# Patient Record
Sex: FEMALE | Race: WHITE | NOT HISPANIC OR LATINO | Employment: OTHER | URBAN - METROPOLITAN AREA
[De-identification: names, ages, dates, MRNs, and addresses within clinical notes are randomized per-mention and may not be internally consistent; named-entity substitution may affect disease eponyms.]

---

## 2017-01-01 DIAGNOSIS — E05.90 HYPERTHYROIDISM: ICD-10-CM

## 2017-01-03 RX ORDER — PROPYLTHIOURACIL 50 MG/1
TABLET ORAL
Qty: 40 TABLET | Refills: 0 | Status: SHIPPED | OUTPATIENT
Start: 2017-01-03 | End: 2017-05-10

## 2017-01-12 ENCOUNTER — TELEPHONE (OUTPATIENT)
Dept: ENDOCRINOLOGY | Age: 77
End: 2017-01-12

## 2017-01-12 DIAGNOSIS — E05.90 HYPERTHYROIDISM: ICD-10-CM

## 2017-01-12 RX ORDER — PROPYLTHIOURACIL 50 MG/1
50 TABLET ORAL 2 TIMES DAILY
Qty: 180 TABLET | Refills: 0 | Status: SHIPPED | OUTPATIENT
Start: 2017-01-12 | End: 2017-04-15 | Stop reason: SDUPTHER

## 2017-01-12 NOTE — TELEPHONE ENCOUNTER
----- Message from Jolie Raza sent at 1/12/2017 10:39 AM EST -----  Contact: Patient  Patient is calling to get a refill of  propylthiouracil (PTU) 50 MG tablet, one tablet 2 times per day, 90 day supply  Send to  [phone] (Phone)  [phone] (Fax)      SCRIPT SENT

## 2017-01-16 DIAGNOSIS — E05.90 HYPERTHYROIDISM: ICD-10-CM

## 2017-01-16 RX ORDER — PROPYLTHIOURACIL 50 MG/1
TABLET ORAL
Qty: 40 TABLET | Refills: 0 | OUTPATIENT
Start: 2017-01-16

## 2017-04-15 DIAGNOSIS — E05.90 HYPERTHYROIDISM: ICD-10-CM

## 2017-04-17 RX ORDER — PROPYLTHIOURACIL 50 MG/1
TABLET ORAL
Qty: 180 TABLET | Refills: 0 | Status: SHIPPED | OUTPATIENT
Start: 2017-04-17 | End: 2017-05-10

## 2017-04-27 ENCOUNTER — LAB (OUTPATIENT)
Dept: ENDOCRINOLOGY | Age: 77
End: 2017-04-27

## 2017-04-27 DIAGNOSIS — E04.2 MULTINODULAR GOITER: ICD-10-CM

## 2017-04-27 DIAGNOSIS — E05.90 HYPERTHYROIDISM: ICD-10-CM

## 2017-04-27 DIAGNOSIS — E05.90 HYPERTHYROIDISM: Primary | ICD-10-CM

## 2017-04-27 LAB
ALBUMIN SERPL-MCNC: 4 G/DL (ref 3.5–5.2)
ALBUMIN/GLOB SERPL: 1.4 G/DL
ALP SERPL-CCNC: 72 U/L (ref 39–117)
ALT SERPL-CCNC: 15 U/L (ref 1–33)
AST SERPL-CCNC: 23 U/L (ref 1–32)
BASOPHILS # BLD AUTO: 0.04 10*3/MM3 (ref 0–0.2)
BASOPHILS NFR BLD AUTO: 0.6 % (ref 0–1.5)
BILIRUB SERPL-MCNC: 0.4 MG/DL (ref 0.1–1.2)
BUN SERPL-MCNC: 21 MG/DL (ref 8–23)
BUN/CREAT SERPL: 28.4 (ref 7–25)
CALCIUM SERPL-MCNC: 9.8 MG/DL (ref 8.6–10.5)
CHLORIDE SERPL-SCNC: 105 MMOL/L (ref 98–107)
CO2 SERPL-SCNC: 26.6 MMOL/L (ref 22–29)
CREAT SERPL-MCNC: 0.74 MG/DL (ref 0.57–1)
EOSINOPHIL # BLD AUTO: 0.13 10*3/MM3 (ref 0–0.7)
EOSINOPHIL NFR BLD AUTO: 1.8 % (ref 0.3–6.2)
ERYTHROCYTE [DISTWIDTH] IN BLOOD BY AUTOMATED COUNT: 13.9 % (ref 11.7–13)
GLOBULIN SER CALC-MCNC: 2.9 GM/DL
GLUCOSE SERPL-MCNC: 91 MG/DL (ref 65–99)
HCT VFR BLD AUTO: 43.2 % (ref 35.6–45.5)
HGB BLD-MCNC: 13.7 G/DL (ref 11.9–15.5)
IMM GRANULOCYTES # BLD: 0 10*3/MM3 (ref 0–0.03)
IMM GRANULOCYTES NFR BLD: 0 % (ref 0–0.5)
LYMPHOCYTES # BLD AUTO: 2.96 10*3/MM3 (ref 0.9–4.8)
LYMPHOCYTES NFR BLD AUTO: 40.7 % (ref 19.6–45.3)
MCH RBC QN AUTO: 30.3 PG (ref 26.9–32)
MCHC RBC AUTO-ENTMCNC: 31.7 G/DL (ref 32.4–36.3)
MCV RBC AUTO: 95.6 FL (ref 80.5–98.2)
MONOCYTES # BLD AUTO: 1.05 10*3/MM3 (ref 0.2–1.2)
MONOCYTES NFR BLD AUTO: 14.4 % (ref 5–12)
NEUTROPHILS # BLD AUTO: 3.09 10*3/MM3 (ref 1.9–8.1)
NEUTROPHILS NFR BLD AUTO: 42.5 % (ref 42.7–76)
PLATELET # BLD AUTO: 198 10*3/MM3 (ref 140–500)
POTASSIUM SERPL-SCNC: 4.5 MMOL/L (ref 3.5–5.2)
PROT SERPL-MCNC: 6.9 G/DL (ref 6–8.5)
RBC # BLD AUTO: 4.52 10*6/MM3 (ref 3.9–5.2)
SODIUM SERPL-SCNC: 144 MMOL/L (ref 136–145)
T4 FREE SERPL-MCNC: 0.96 NG/DL (ref 0.93–1.7)
TSH SERPL DL<=0.005 MIU/L-ACNC: 6.52 MIU/ML (ref 0.27–4.2)
WBC # BLD AUTO: 7.27 10*3/MM3 (ref 4.5–10.7)

## 2017-05-10 ENCOUNTER — OFFICE VISIT (OUTPATIENT)
Dept: ENDOCRINOLOGY | Age: 77
End: 2017-05-10

## 2017-05-10 VITALS
HEART RATE: 62 BPM | WEIGHT: 202.8 LBS | BODY MASS INDEX: 35.93 KG/M2 | HEIGHT: 63 IN | DIASTOLIC BLOOD PRESSURE: 70 MMHG | OXYGEN SATURATION: 91 % | SYSTOLIC BLOOD PRESSURE: 116 MMHG

## 2017-05-10 DIAGNOSIS — E04.2 MULTINODULAR GOITER: ICD-10-CM

## 2017-05-10 DIAGNOSIS — E05.90 HYPERTHYROIDISM: ICD-10-CM

## 2017-05-10 DIAGNOSIS — R63.5 ABNORMAL WEIGHT GAIN: ICD-10-CM

## 2017-05-10 DIAGNOSIS — R53.82 CHRONIC FATIGUE: ICD-10-CM

## 2017-05-10 DIAGNOSIS — E03.2 HYPOTHYROIDISM DUE TO MEDICATION: Primary | ICD-10-CM

## 2017-05-10 PROCEDURE — 99214 OFFICE O/P EST MOD 30 MIN: CPT | Performed by: INTERNAL MEDICINE

## 2017-06-21 ENCOUNTER — LAB (OUTPATIENT)
Dept: ENDOCRINOLOGY | Age: 77
End: 2017-06-21

## 2017-06-21 DIAGNOSIS — E04.2 MULTINODULAR GOITER: ICD-10-CM

## 2017-06-21 DIAGNOSIS — E05.90 HYPERTHYROIDISM: ICD-10-CM

## 2017-06-21 DIAGNOSIS — R63.5 ABNORMAL WEIGHT GAIN: ICD-10-CM

## 2017-06-21 DIAGNOSIS — E03.2 HYPOTHYROIDISM DUE TO MEDICATION: ICD-10-CM

## 2017-06-22 LAB
T4 FREE SERPL-MCNC: 1.47 NG/DL (ref 0.93–1.7)
TSH SERPL DL<=0.005 MIU/L-ACNC: 0.02 MIU/ML (ref 0.27–4.2)

## 2017-07-13 RX ORDER — PROPYLTHIOURACIL 50 MG/1
50 TABLET ORAL DAILY
Qty: 30 TABLET | Refills: 3 | Status: SHIPPED | OUTPATIENT
Start: 2017-07-13 | End: 2017-12-20

## 2017-07-14 DIAGNOSIS — E05.90 HYPERTHYROIDISM: ICD-10-CM

## 2017-07-14 RX ORDER — PROPYLTHIOURACIL 50 MG/1
TABLET ORAL
Qty: 180 TABLET | Refills: 0 | Status: SHIPPED | OUTPATIENT
Start: 2017-07-14 | End: 2017-12-20 | Stop reason: SDUPTHER

## 2017-09-11 LAB
T4 FREE SERPL-MCNC: 1.04 NG/DL (ref 0.93–1.7)
TSH SERPL DL<=0.005 MIU/L-ACNC: 0.65 MIU/ML (ref 0.27–4.2)

## 2017-12-20 ENCOUNTER — OFFICE VISIT (OUTPATIENT)
Dept: ENDOCRINOLOGY | Age: 77
End: 2017-12-20

## 2017-12-20 VITALS
SYSTOLIC BLOOD PRESSURE: 130 MMHG | DIASTOLIC BLOOD PRESSURE: 80 MMHG | HEIGHT: 63 IN | WEIGHT: 204 LBS | HEART RATE: 64 BPM | BODY MASS INDEX: 36.14 KG/M2

## 2017-12-20 DIAGNOSIS — E05.90 HYPERTHYROIDISM: ICD-10-CM

## 2017-12-20 DIAGNOSIS — R63.5 ABNORMAL WEIGHT GAIN: ICD-10-CM

## 2017-12-20 DIAGNOSIS — E04.2 MULTINODULAR GOITER: ICD-10-CM

## 2017-12-20 DIAGNOSIS — R53.82 CHRONIC FATIGUE: ICD-10-CM

## 2017-12-20 DIAGNOSIS — E03.2 HYPOTHYROIDISM DUE TO MEDICATION: Primary | ICD-10-CM

## 2017-12-20 DIAGNOSIS — E78.49 OTHER HYPERLIPIDEMIA: ICD-10-CM

## 2017-12-20 LAB
ALBUMIN SERPL-MCNC: 4.2 G/DL (ref 3.5–5.2)
ALBUMIN/GLOB SERPL: 1.3 G/DL
ALP SERPL-CCNC: 79 U/L (ref 39–117)
ALT SERPL-CCNC: 16 U/L (ref 1–33)
AST SERPL-CCNC: 20 U/L (ref 1–32)
BASOPHILS # BLD AUTO: 0.03 10*3/MM3 (ref 0–0.2)
BASOPHILS NFR BLD AUTO: 0.4 % (ref 0–1.5)
BILIRUB SERPL-MCNC: 0.5 MG/DL (ref 0.1–1.2)
BUN SERPL-MCNC: 13 MG/DL (ref 8–23)
BUN/CREAT SERPL: 20.6 (ref 7–25)
CALCIUM SERPL-MCNC: 9.6 MG/DL (ref 8.6–10.5)
CHLORIDE SERPL-SCNC: 103 MMOL/L (ref 98–107)
CO2 SERPL-SCNC: 27.6 MMOL/L (ref 22–29)
CREAT SERPL-MCNC: 0.63 MG/DL (ref 0.57–1)
EOSINOPHIL # BLD AUTO: 0.24 10*3/MM3 (ref 0–0.7)
EOSINOPHIL NFR BLD AUTO: 3 % (ref 0.3–6.2)
ERYTHROCYTE [DISTWIDTH] IN BLOOD BY AUTOMATED COUNT: 13.9 % (ref 11.7–13)
GLOBULIN SER CALC-MCNC: 3.2 GM/DL
GLUCOSE SERPL-MCNC: 91 MG/DL (ref 65–99)
HCT VFR BLD AUTO: 42.3 % (ref 35.6–45.5)
HGB BLD-MCNC: 13.4 G/DL (ref 11.9–15.5)
IMM GRANULOCYTES # BLD: 0 10*3/MM3 (ref 0–0.03)
IMM GRANULOCYTES NFR BLD: 0 % (ref 0–0.5)
LYMPHOCYTES # BLD AUTO: 2.99 10*3/MM3 (ref 0.9–4.8)
LYMPHOCYTES NFR BLD AUTO: 37 % (ref 19.6–45.3)
MCH RBC QN AUTO: 29.8 PG (ref 26.9–32)
MCHC RBC AUTO-ENTMCNC: 31.7 G/DL (ref 32.4–36.3)
MCV RBC AUTO: 94.2 FL (ref 80.5–98.2)
MONOCYTES # BLD AUTO: 0.81 10*3/MM3 (ref 0.2–1.2)
MONOCYTES NFR BLD AUTO: 10 % (ref 5–12)
NEUTROPHILS # BLD AUTO: 4.01 10*3/MM3 (ref 1.9–8.1)
NEUTROPHILS NFR BLD AUTO: 49.6 % (ref 42.7–76)
PLATELET # BLD AUTO: 232 10*3/MM3 (ref 140–500)
POTASSIUM SERPL-SCNC: 4.5 MMOL/L (ref 3.5–5.2)
PROT SERPL-MCNC: 7.4 G/DL (ref 6–8.5)
RBC # BLD AUTO: 4.49 10*6/MM3 (ref 3.9–5.2)
SODIUM SERPL-SCNC: 143 MMOL/L (ref 136–145)
T4 FREE SERPL-MCNC: 1.11 NG/DL (ref 0.93–1.7)
TSH SERPL DL<=0.005 MIU/L-ACNC: 0.64 MIU/ML (ref 0.27–4.2)
WBC # BLD AUTO: 8.08 10*3/MM3 (ref 4.5–10.7)

## 2017-12-20 PROCEDURE — 99214 OFFICE O/P EST MOD 30 MIN: CPT | Performed by: INTERNAL MEDICINE

## 2017-12-20 RX ORDER — PROPYLTHIOURACIL 50 MG/1
50 TABLET ORAL 2 TIMES DAILY WITH MEALS
Qty: 180 TABLET | Refills: 1 | Status: SHIPPED | OUTPATIENT
Start: 2017-12-20 | End: 2018-09-27 | Stop reason: SDUPTHER

## 2017-12-20 NOTE — PROGRESS NOTES
77 y.o.    Patient Care Team:  Nohelia Engel MD as PCP - General (Pediatrics)    Chief Complaint:      F/U HYPERTHYROIDISM.  MULTINODULAR GOITER.  NO RECENT LABS.  Subjective     HPI     Patient is a 77-year-old white female with a past history of multinodular goiter and hyperthyroidism came for followup.  Hyperthyroidism.  Patient is currently taking PTU 50 mg one tablet twice daily.  She reports compliance with the medication.  She denies any side effects on the medication.  Have number weight gain.  Patient currently weighs 204 pounds with a BMI of 36.1.  She's not been able to lose weight.  Multinodular goiter.  Patient denies any difficulty swallowing or change in her voice.  Fatigue.  Patient has been experiencing increasing fatigue over the past 6 months     Interval History        Patient was evaluated by me during her hospitalization in August 2016 at [facility] when she was admitted for atrial fibrillation with rapid ventricular response. Her TSH and T4 data suggestive of hyperthyroidism. Initial evaluation was negative for antibody testing.  Patient was started on Tapazole with good response and she was advised to come for follow-up in one month.      Patient reported that she missed that appointment and could not reschedule until today.  Patient did come to the clinic reporting neck pain and headache and other symptoms while she was on methimazole and wanted to discontinue that medication. Subsequently due to persistently high thyroid levels I recommend that she might try PTU.  Since then patient has been on  mg 2 times daily.      Patient denies any side effects on the medication PTU  Patient is also taking metoprolol 1 tablet twice daily 50 mg  Patient denies any dizziness or lightheadedness but she does feel forgetful and feels fatigued.  Patient denied any difficulty swallowing. She also denied any change in her voice.     The following portions of the patient's history were  reviewed and updated as appropriate: allergies, current medications, past family history, past medical history, past social history, past surgical history and problem list.    Past Medical History:   Diagnosis Date   • A-fib    • Hyperlipidemia    • Hypertension    • Hyperthyroidism      Family History   Problem Relation Age of Onset   • Diabetes Mother    • Heart disease Father    • Heart attack Father      Social History     Social History   • Marital status: Unknown     Spouse name: N/A   • Number of children: N/A   • Years of education: N/A     Occupational History   • Not on file.     Social History Main Topics   • Smoking status: Never Smoker   • Smokeless tobacco: Not on file   • Alcohol use No   • Drug use: No   • Sexual activity: Not on file     Other Topics Concern   • Not on file     Social History Narrative     Allergies   Allergen Reactions   • Acetaminophen Other (See Comments)     holucinations   • Eggs Or Egg-Derived Products Hives   • Latex    • Lidocaine    • Nickel Hives     Metals other than gold and silver       Current Outpatient Prescriptions:   •  amLODIPine (NORVASC) 5 MG tablet, , Disp: , Rfl:   •  atorvastatin (LIPITOR) 40 MG tablet, Take 40 mg by mouth., Disp: , Rfl:   •  ELIQUIS 5 MG tablet tablet, , Disp: , Rfl:   •  furosemide (LASIX) 20 MG tablet, TAKE ONE TABLET BY MOUTH DAILY, Disp: , Rfl:   •  metoprolol tartrate (LOPRESSOR) 50 MG tablet, , Disp: , Rfl:   •  propylthiouracil (PTU) 50 MG tablet, Take 1 tablet by mouth Daily., Disp: 30 tablet, Rfl: 3  •  propylthiouracil (PTU) 50 MG tablet, TAKE ONE TABLET BY MOUTH TWICE A DAY, Disp: 180 tablet, Rfl: 0  •  triamcinolone (KENALOG) 0.1 % cream, , Disp: , Rfl:     Review of Systems   Constitutional: Negative for chills, fatigue and fever.   Cardiovascular: Positive for palpitations. Negative for chest pain.   Gastrointestinal: Negative for abdominal pain, constipation, diarrhea, nausea and vomiting.   Endocrine: Positive for cold  "intolerance. Negative for heat intolerance.   All other systems reviewed and are negative.      Objective       Vitals:    12/20/17 1547   BP: 130/80   Pulse: 64   Weight: 92.5 kg (204 lb)   Height: 160 cm (63\")     Body mass index is 36.14 kg/(m^2).      Physical Exam   Constitutional: She is oriented to person, place, and time. She appears well-developed and well-nourished.   HENT:   Head: Normocephalic and atraumatic.   Eyes: EOM are normal. Pupils are equal, round, and reactive to light.   Neck: Normal range of motion. Neck supple. Thyromegaly present.   Cardiovascular: Normal rate, regular rhythm, normal heart sounds and intact distal pulses.    Pulmonary/Chest: Effort normal and breath sounds normal. She has no wheezes. She has no rales.   Abdominal: Soft. Bowel sounds are normal. She exhibits distension. There is no tenderness.   Musculoskeletal: Normal range of motion. She exhibits no edema.   Neurological: She is alert and oriented to person, place, and time. She has normal reflexes.   Skin: Skin is warm and dry. No rash noted.   Psychiatric: She has a normal mood and affect. Her behavior is normal.   Nursing note and vitals reviewed.    Results Review:     I reviewed the patient's new clinical results.    Medical records reviewed  Summary:      Lab on 06/21/2017   Component Date Value Ref Range Status   • Free T4 06/21/2017 1.47  0.93 - 1.70 ng/dL Final   • TSH 06/21/2017 0.021* 0.270 - 4.200 mIU/mL Final   • TSH 09/11/2017 0.648  0.270 - 4.200 mIU/mL Final   • Free T4 09/11/2017 1.04  0.93 - 1.70 ng/dL Final     No results found for: HGBA1C  Lab Results   Component Value Date    CREATININE 0.74 04/27/2017     Imaging Results (most recent)     None                Assessment and Plan:    Edelmira was seen today for hyperthyroidism and goiter.    Diagnoses and all orders for this visit:    Hypothyroidism due to medication  -     Comprehensive Metabolic Panel  -     T4, Free  -     TSH  -     CBC & " "Differential    Hyperthyroidism  -     Comprehensive Metabolic Panel  -     T4, Free  -     TSH  -     CBC & Differential  -     propylthiouracil (PTU) 50 MG tablet; Take 1 tablet by mouth 2 (Two) Times a Day With Meals.    Multinodular goiter    Abnormal weight gain    Chronic fatigue    Other hyperlipidemia    Side effects from the medication. PTU, reviewed again with the patient.  Patient is tolerating the medication.  She takes one tablet twice daily.  Patient is gained a few pounds in the past 6 months.  She reports occasional palpitations, but no other symptoms of hyper or hypothyroidism.  Patient will get lab testing done today.  She will return to follow up in 6 months.     The total time spent for old record and lab review and face- to- face was more than 25 min of which greater than 15 min of time was spent on counseling the patient on recommended evaluation and treatment options, instructions for management/treatment and /or follow up  and importance of compliance with chosen management or treatment options   Baudilio Pulido MD. FACE    12/20/17      EMR Dragon / transcription disclaimer:     \"Dictated utilizing Dragon dictation\".         "

## 2018-02-02 ENCOUNTER — LAB (OUTPATIENT)
Dept: ENDOCRINOLOGY | Age: 78
End: 2018-02-02

## 2018-02-02 DIAGNOSIS — E04.2 MULTINODULAR GOITER: ICD-10-CM

## 2018-02-02 DIAGNOSIS — E03.2 HYPOTHYROIDISM DUE TO MEDICATION: ICD-10-CM

## 2018-02-02 DIAGNOSIS — E05.90 HYPERTHYROIDISM: Primary | ICD-10-CM

## 2018-02-02 DIAGNOSIS — E05.90 HYPERTHYROIDISM: ICD-10-CM

## 2018-02-03 LAB
ALBUMIN SERPL-MCNC: 3.6 G/DL (ref 3.5–5.2)
ALBUMIN/GLOB SERPL: 1.1 G/DL
ALP SERPL-CCNC: 66 U/L (ref 39–117)
ALT SERPL-CCNC: 20 U/L (ref 1–33)
AST SERPL-CCNC: 21 U/L (ref 1–32)
BILIRUB SERPL-MCNC: 0.3 MG/DL (ref 0.1–1.2)
BUN SERPL-MCNC: 16 MG/DL (ref 8–23)
BUN/CREAT SERPL: 22.9 (ref 7–25)
CALCIUM SERPL-MCNC: 9.5 MG/DL (ref 8.6–10.5)
CHLORIDE SERPL-SCNC: 103 MMOL/L (ref 98–107)
CO2 SERPL-SCNC: 28.3 MMOL/L (ref 22–29)
CREAT SERPL-MCNC: 0.7 MG/DL (ref 0.57–1)
GFR SERPLBLD CREATININE-BSD FMLA CKD-EPI: 81 ML/MIN/1.73
GFR SERPLBLD CREATININE-BSD FMLA CKD-EPI: 99 ML/MIN/1.73
GLOBULIN SER CALC-MCNC: 3.4 GM/DL
GLUCOSE SERPL-MCNC: 99 MG/DL (ref 65–99)
POTASSIUM SERPL-SCNC: 4.6 MMOL/L (ref 3.5–5.2)
PROT SERPL-MCNC: 7 G/DL (ref 6–8.5)
SODIUM SERPL-SCNC: 143 MMOL/L (ref 136–145)
T4 FREE SERPL-MCNC: 1.41 NG/DL (ref 0.93–1.7)
TSH SERPL DL<=0.005 MIU/L-ACNC: 1.08 MIU/ML (ref 0.27–4.2)

## 2018-06-15 ENCOUNTER — RESULTS ENCOUNTER (OUTPATIENT)
Dept: ENDOCRINOLOGY | Age: 78
End: 2018-06-15

## 2018-06-15 DIAGNOSIS — E03.2 HYPOTHYROIDISM DUE TO MEDICATION: ICD-10-CM

## 2018-06-15 DIAGNOSIS — E05.90 HYPERTHYROIDISM: ICD-10-CM

## 2018-06-15 DIAGNOSIS — E05.90 HYPERTHYROIDISM: Primary | ICD-10-CM

## 2018-06-22 ENCOUNTER — OFFICE VISIT (OUTPATIENT)
Dept: ENDOCRINOLOGY | Age: 78
End: 2018-06-22

## 2018-06-22 VITALS
HEART RATE: 61 BPM | HEIGHT: 63 IN | DIASTOLIC BLOOD PRESSURE: 78 MMHG | BODY MASS INDEX: 35.51 KG/M2 | WEIGHT: 200.4 LBS | SYSTOLIC BLOOD PRESSURE: 122 MMHG

## 2018-06-22 DIAGNOSIS — R53.82 CHRONIC FATIGUE: ICD-10-CM

## 2018-06-22 DIAGNOSIS — E04.2 MULTINODULAR GOITER: ICD-10-CM

## 2018-06-22 DIAGNOSIS — R63.5 ABNORMAL WEIGHT GAIN: ICD-10-CM

## 2018-06-22 DIAGNOSIS — E05.90 HYPERTHYROIDISM: Primary | ICD-10-CM

## 2018-06-22 LAB
BASOPHILS # BLD MANUAL: 0 10*3/MM3 (ref 0–0.2)
BASOPHILS NFR BLD MANUAL: 0 % (ref 0–1.5)
DIFFERENTIAL COMMENT: NORMAL
EOSINOPHIL # BLD MANUAL: 0.19 10*3/MM3 (ref 0–0.7)
EOSINOPHIL NFR BLD MANUAL: 3 % (ref 0.3–6.2)
ERYTHROCYTE [DISTWIDTH] IN BLOOD BY AUTOMATED COUNT: 13.7 % (ref 11.7–13)
HCT VFR BLD AUTO: 43.9 % (ref 35.6–45.5)
HGB BLD-MCNC: 13.7 G/DL (ref 11.9–15.5)
LYMPHOCYTES # BLD MANUAL: 2.29 10*3/MM3 (ref 0.9–4.8)
LYMPHOCYTES NFR BLD MANUAL: 37 % (ref 19.6–45.3)
MCH RBC QN AUTO: 29.5 PG (ref 26.9–32)
MCHC RBC AUTO-ENTMCNC: 31.2 G/DL (ref 32.4–36.3)
MCV RBC AUTO: 94.6 FL (ref 80.5–98.2)
MONOCYTES # BLD MANUAL: 0.68 10*3/MM3 (ref 0.2–1.2)
MONOCYTES NFR BLD MANUAL: 11 % (ref 5–12)
NEUTROPHILS # BLD MANUAL: 3.04 10*3/MM3 (ref 1.9–8.1)
NEUTROPHILS NFR BLD MANUAL: 49 % (ref 42.7–76)
PLATELET # BLD AUTO: 174 10*3/MM3 (ref 140–500)
PLATELET BLD QL SMEAR: NORMAL
RBC # BLD AUTO: 4.64 10*6/MM3 (ref 3.9–5.2)
RBC MORPH BLD: NORMAL
WBC # BLD AUTO: 6.2 10*3/MM3 (ref 4.5–10.7)

## 2018-06-22 PROCEDURE — 99214 OFFICE O/P EST MOD 30 MIN: CPT | Performed by: INTERNAL MEDICINE

## 2018-06-22 RX ORDER — NYSTATIN 100000 [USP'U]/G
POWDER TOPICAL
COMMUNITY
Start: 2018-03-27

## 2018-06-22 NOTE — PROGRESS NOTES
77 y.o.    Patient Care Team:  Nohelia Engel MD as PCP - General (Pediatrics)    Chief Complaint:      F/U HYPERTHYROIDISM.  HERE TO DISCUSS LAB RESULTS.  Subjective     HPI   Patient is a 77-year-old white female with a past history of multinodular goiter and hyperthyroidism came for follow-up  Hypothyroidism  Patient is currently taking PTU 50 mg 1 tablet daily.  She reports compliance with the medication and denies any side effects  Abnormal weight gain  Patient currently weighs 200 pounds with a BMI of 35.5.  She reports that she has not been able to lose weight over the past several years.  Multinodular goiter  Patient denies any difficulty swallowing or change in her voice.  Fatigue  Patient reports constant fatigue over the past one year.  She denies any improvement in her symptoms since cutting back on PTU     Interval History        Patient was evaluated by me during her hospitalization in August 2016 at [facility] when she was admitted for atrial fibrillation with rapid ventricular response. Her TSH and T4 data suggestive of hyperthyroidism. Initial evaluation was negative for antibody testing.  Patient was started on Tapazole with good response and she was advised to come for follow-up in one month.      Patient reported that she missed that appointment and could not reschedule until today.  Patient did come to the clinic reporting neck pain and headache and other symptoms while she was on methimazole and wanted to discontinue that medication. Subsequently due to persistently high thyroid levels I recommend that she might try PTU.  Since then patient has been on  mg 2 times daily.      Patient denies any side effects on the medication PTU  Patient is also taking metoprolol 1 tablet twice daily 50 mg  Patient denies any dizziness or lightheadedness but she does feel forgetful and feels fatigued.  Patient denied any difficulty swallowing. She also denied any change in her  voice.  The following portions of the patient's history were reviewed and updated as appropriate: allergies, current medications, past family history, past medical history, past social history, past surgical history and problem list.    Past Medical History:   Diagnosis Date   • A-fib    • Hyperlipidemia    • Hypertension    • Hyperthyroidism      Family History   Problem Relation Age of Onset   • Diabetes Mother    • Heart disease Father    • Heart attack Father      Social History     Social History   • Marital status: Unknown     Spouse name: N/A   • Number of children: N/A   • Years of education: N/A     Occupational History   • Not on file.     Social History Main Topics   • Smoking status: Never Smoker   • Smokeless tobacco: Never Used   • Alcohol use No   • Drug use: No   • Sexual activity: Not on file     Other Topics Concern   • Not on file     Social History Narrative   • No narrative on file     Allergies   Allergen Reactions   • Acetaminophen Other (See Comments)     holucinations   • Codeine Unknown (See Comments)   • Eggs Or Egg-Derived Products Hives   • Latex    • Lidocaine    • Nickel Hives     Metals other than gold and silver       Current Outpatient Prescriptions:   •  amLODIPine (NORVASC) 5 MG tablet, , Disp: , Rfl:   •  atorvastatin (LIPITOR) 40 MG tablet, Take 40 mg by mouth., Disp: , Rfl:   •  ELIQUIS 5 MG tablet tablet, , Disp: , Rfl:   •  furosemide (LASIX) 20 MG tablet, TAKE ONE TABLET BY MOUTH DAILY, Disp: , Rfl:   •  metoprolol tartrate (LOPRESSOR) 50 MG tablet, , Disp: , Rfl:   •  propylthiouracil (PTU) 50 MG tablet, Take 1 tablet by mouth 2 (Two) Times a Day With Meals., Disp: 180 tablet, Rfl: 1  •  triamcinolone (KENALOG) 0.1 % cream, , Disp: , Rfl:         Review of Systems   Constitutional: Negative for chills, fatigue and fever.   Cardiovascular: Positive for palpitations. Negative for chest pain.   Gastrointestinal: Negative for abdominal pain, constipation, diarrhea, nausea and  "vomiting.   Endocrine: Negative for cold intolerance and heat intolerance.   All other systems reviewed and are negative.      Objective       Vitals:    06/22/18 1456   BP: 122/78   Pulse: 61   Weight: 90.9 kg (200 lb 6.4 oz)   Height: 160 cm (63\")     Body mass index is 35.5 kg/m².      Physical Exam   Constitutional: She is oriented to person, place, and time. She appears well-developed and well-nourished.   HENT:   Head: Normocephalic and atraumatic.   Eyes: EOM are normal. Pupils are equal, round, and reactive to light.   Neck: Normal range of motion. Neck supple. Thyromegaly present.   Cardiovascular: Normal rate, regular rhythm, normal heart sounds and intact distal pulses.    Pulmonary/Chest: Effort normal and breath sounds normal. She has no wheezes. She has no rales.   Abdominal: Soft. Bowel sounds are normal. She exhibits distension. There is no tenderness.   Musculoskeletal: Normal range of motion. She exhibits no edema.   Neurological: She is alert and oriented to person, place, and time. She has normal reflexes.   Skin: Skin is warm and dry. No rash noted.   Psychiatric: She has a normal mood and affect. Her behavior is normal.   Nursing note and vitals reviewed.    Results Review:     I reviewed the patient's new clinical results.    Medical records reviewed  Summary:      Lab on 02/02/2018   Component Date Value Ref Range Status   • Glucose 02/02/2018 99  65 - 99 mg/dL Final   • BUN 02/02/2018 16  8 - 23 mg/dL Final   • Creatinine 02/02/2018 0.70  0.57 - 1.00 mg/dL Final   • eGFR Non  Am 02/02/2018 81  >60 mL/min/1.73 Final    Comment: The MDRD GFR formula is only valid for adults with stable  renal function between ages 18 and 70.     • eGFR  Am 02/02/2018 99  >60 mL/min/1.73 Final   • BUN/Creatinine Ratio 02/02/2018 22.9  7.0 - 25.0 Final   • Sodium 02/02/2018 143  136 - 145 mmol/L Final   • Potassium 02/02/2018 4.6  3.5 - 5.2 mmol/L Final   • Chloride 02/02/2018 103  98 - 107 mmol/L " Final   • Total CO2 02/02/2018 28.3  22.0 - 29.0 mmol/L Final   • Calcium 02/02/2018 9.5  8.6 - 10.5 mg/dL Final   • Total Protein 02/02/2018 7.0  6.0 - 8.5 g/dL Final   • Albumin 02/02/2018 3.60  3.50 - 5.20 g/dL Final   • Globulin 02/02/2018 3.4  gm/dL Final   • A/G Ratio 02/02/2018 1.1  g/dL Final   • Total Bilirubin 02/02/2018 0.3  0.1 - 1.2 mg/dL Final   • Alkaline Phosphatase 02/02/2018 66  39 - 117 U/L Final   • AST (SGOT) 02/02/2018 21  1 - 32 U/L Final   • ALT (SGPT) 02/02/2018 20  1 - 33 U/L Final   • Free T4 02/02/2018 1.41  0.93 - 1.70 ng/dL Final   • TSH 02/02/2018 1.080  0.270 - 4.200 mIU/mL Final     No results found for: HGBA1C  Lab Results   Component Value Date    CREATININE 0.70 02/02/2018     Imaging Results (most recent)     None                Assessment and Plan:    Edelmira was seen today for hyperthyroidism.    Diagnoses and all orders for this visit:    Hyperthyroidism  -     CBC & Differential    Multinodular goiter    Abnormal weight gain    Chronic fatigue    Other orders  -     Manual Differential      Patient is currently taking PTU 50 mg 1 tablet daily in the morning  She used to be and 2 tablets in the previous visit she was advised to take only 1  Patient denies any specific symptoms relating to medication  She denies any side effects  She was reasonably okay since changing the medication  Patient denies any symptoms suggestive of hyper or hypothyroidism  Patient's last TSH was 1.0 and stable  Patient will get lab testing done today  She will return to follow-up in 6 months.     The total time spent  was more than 25 min of which greater than 15 min of time ( greater than 50% of the total time )  was spent face to face with the patient counseling and coordination of care on recommended evaluation and treatment options, instructions for management/treatment and /or follow up  and importance of compliance with chosen management or treatment options    Baudilio Pulido MD.  "FACE    06/22/18      EMR Dragon / transcription disclaimer:     \"Dictated utilizing Dragon dictation\".         "

## 2018-09-26 DIAGNOSIS — E05.90 HYPERTHYROIDISM: ICD-10-CM

## 2018-09-27 DIAGNOSIS — E05.90 HYPERTHYROIDISM: ICD-10-CM

## 2018-09-27 RX ORDER — PROPYLTHIOURACIL 50 MG/1
50 TABLET ORAL DAILY
Qty: 90 TABLET | Refills: 1 | Status: SHIPPED | OUTPATIENT
Start: 2018-09-27 | End: 2018-12-17 | Stop reason: SDUPTHER

## 2018-09-27 RX ORDER — PROPYLTHIOURACIL 50 MG/1
TABLET ORAL
Qty: 180 TABLET | Refills: 0 | OUTPATIENT
Start: 2018-09-27

## 2018-12-10 ENCOUNTER — LAB (OUTPATIENT)
Dept: ENDOCRINOLOGY | Age: 78
End: 2018-12-10

## 2018-12-10 DIAGNOSIS — E05.90 HYPERTHYROIDISM: ICD-10-CM

## 2018-12-10 DIAGNOSIS — E05.90 HYPERTHYROIDISM: Primary | ICD-10-CM

## 2018-12-11 LAB
ALBUMIN SERPL-MCNC: 3.8 G/DL (ref 3.5–5.2)
ALBUMIN/GLOB SERPL: 1.4 G/DL
ALP SERPL-CCNC: 74 U/L (ref 39–117)
ALT SERPL-CCNC: 19 U/L (ref 1–33)
AST SERPL-CCNC: 22 U/L (ref 1–32)
BASOPHILS # BLD AUTO: 0.03 10*3/MM3 (ref 0–0.2)
BASOPHILS NFR BLD AUTO: 0.4 % (ref 0–1.5)
BILIRUB SERPL-MCNC: 0.5 MG/DL (ref 0.1–1.2)
BUN SERPL-MCNC: 21 MG/DL (ref 8–23)
BUN/CREAT SERPL: 26.6 (ref 7–25)
CALCIUM SERPL-MCNC: 9.6 MG/DL (ref 8.6–10.5)
CHLORIDE SERPL-SCNC: 105 MMOL/L (ref 98–107)
CO2 SERPL-SCNC: 29.9 MMOL/L (ref 22–29)
CREAT SERPL-MCNC: 0.79 MG/DL (ref 0.57–1)
EOSINOPHIL # BLD AUTO: 0.21 10*3/MM3 (ref 0–0.7)
EOSINOPHIL NFR BLD AUTO: 2.9 % (ref 0.3–6.2)
ERYTHROCYTE [DISTWIDTH] IN BLOOD BY AUTOMATED COUNT: 13.6 % (ref 11.7–13)
GLOBULIN SER CALC-MCNC: 2.8 GM/DL
GLUCOSE SERPL-MCNC: 87 MG/DL (ref 65–99)
HCT VFR BLD AUTO: 42.8 % (ref 35.6–45.5)
HGB BLD-MCNC: 13.9 G/DL (ref 11.9–15.5)
IMM GRANULOCYTES # BLD: 0.01 10*3/MM3 (ref 0–0.03)
IMM GRANULOCYTES NFR BLD: 0.1 % (ref 0–0.5)
LYMPHOCYTES # BLD AUTO: 2.99 10*3/MM3 (ref 0.9–4.8)
LYMPHOCYTES NFR BLD AUTO: 42 % (ref 19.6–45.3)
MCH RBC QN AUTO: 29.8 PG (ref 26.9–32)
MCHC RBC AUTO-ENTMCNC: 32.5 G/DL (ref 32.4–36.3)
MCV RBC AUTO: 91.8 FL (ref 80.5–98.2)
MONOCYTES # BLD AUTO: 0.81 10*3/MM3 (ref 0.2–1.2)
MONOCYTES NFR BLD AUTO: 11.4 % (ref 5–12)
NEUTROPHILS # BLD AUTO: 3.08 10*3/MM3 (ref 1.9–8.1)
NEUTROPHILS NFR BLD AUTO: 43.3 % (ref 42.7–76)
PLATELET # BLD AUTO: 185 10*3/MM3 (ref 140–500)
POTASSIUM SERPL-SCNC: 4.9 MMOL/L (ref 3.5–5.2)
PROT SERPL-MCNC: 6.6 G/DL (ref 6–8.5)
RBC # BLD AUTO: 4.66 10*6/MM3 (ref 3.9–5.2)
SODIUM SERPL-SCNC: 144 MMOL/L (ref 136–145)
T4 FREE SERPL-MCNC: 1.17 NG/DL (ref 0.93–1.7)
TSH SERPL DL<=0.005 MIU/L-ACNC: 0.3 MIU/ML (ref 0.27–4.2)
WBC # BLD AUTO: 7.12 10*3/MM3 (ref 4.5–10.7)

## 2018-12-17 ENCOUNTER — OFFICE VISIT (OUTPATIENT)
Dept: ENDOCRINOLOGY | Age: 78
End: 2018-12-17

## 2018-12-17 VITALS
DIASTOLIC BLOOD PRESSURE: 74 MMHG | WEIGHT: 202.4 LBS | HEART RATE: 51 BPM | HEIGHT: 63 IN | SYSTOLIC BLOOD PRESSURE: 120 MMHG | BODY MASS INDEX: 35.86 KG/M2

## 2018-12-17 DIAGNOSIS — R63.5 ABNORMAL WEIGHT GAIN: Primary | ICD-10-CM

## 2018-12-17 DIAGNOSIS — E04.2 MULTINODULAR GOITER: ICD-10-CM

## 2018-12-17 DIAGNOSIS — R53.82 CHRONIC FATIGUE: ICD-10-CM

## 2018-12-17 DIAGNOSIS — E05.90 HYPERTHYROIDISM: ICD-10-CM

## 2018-12-17 PROCEDURE — 99214 OFFICE O/P EST MOD 30 MIN: CPT | Performed by: INTERNAL MEDICINE

## 2018-12-17 RX ORDER — PROPYLTHIOURACIL 50 MG/1
50 TABLET ORAL DAILY
Qty: 90 TABLET | Refills: 1 | Status: SHIPPED | OUTPATIENT
Start: 2018-12-17 | End: 2019-06-17 | Stop reason: SDUPTHER

## 2018-12-17 NOTE — PROGRESS NOTES
78 y.o.    Patient Care Team:  Nohelia Engel MD as PCP - General (Pediatrics)    Chief Complaint:      F/U HYPERTHYROIDISM.  HERE TO DISCUSS LAB RESULTS.     Subjective     HPI   Patient is a 78-year-old white female with a past history of multinodular goiter and hypothyroidism came for follow-up    Hyperthyroidism  Patient is currently taking PTU 50 mg 1 tablet daily in the morning  She reports compliance with the medication  Denies any side effects  Abnormal weight gain  Patient currently weighs 202 pounds with a BMI of 36  She reports that she has not been able to lose weight especially during holidays  Multinodular goiter  Patient denies any difficulty swallowing or change in voice  Fatigue  Patient reports chronic fatigue and denies any significant improvement in fatigue since changing the PTU  She is currently taking only 1 tablet a PTU in the morning      Interval History    Patient was evaluated by me during her hospitalization in August 2016 at [facility] when she was admitted for atrial fibrillation with rapid ventricular response. Her TSH and T4 data suggestive of hyperthyroidism. Initial evaluation was negative for antibody testing.  Patient was started on Tapazole with good response and she was advised to come for follow-up in one month.      Patient reported that she missed that appointment and could not reschedule until today.  Patient did come to the clinic reporting neck pain and headache and other symptoms while she was on methimazole and wanted to discontinue that medication. Subsequently due to persistently high thyroid levels I recommend that she might try PTU.  Since then patient has been on  mg 2 times daily.      Patient denies any side effects on the medication PTU  Patient is also taking metoprolol 1 tablet twice daily 50 mg  Patient denies any dizziness or lightheadedness but she does feel forgetful and feels fatigued.  Patient denied any difficulty swallowing.  She also denied any change in her voice.        The following portions of the patient's history were reviewed and updated as appropriate: allergies, current medications, past family history, past medical history, past social history, past surgical history and problem list.    Past Medical History:   Diagnosis Date   • A-fib (CMS/[facility])    • Hyperlipidemia    • Hypertension    • Hyperthyroidism      Family History   Problem Relation Age of Onset   • Diabetes Mother    • Heart disease Father    • Heart attack Father      Social History     Socioeconomic History   • Marital status: Unknown     Spouse name: Not on file   • Number of children: Not on file   • Years of education: Not on file   • Highest education level: Not on file   Social Needs   • Financial resource strain: Not on file   • Food insecurity - worry: Not on file   • Food insecurity - inability: Not on file   • Transportation needs - medical: Not on file   • Transportation needs - non-medical: Not on file   Occupational History   • Not on file   Tobacco Use   • Smoking status: Never Smoker   • Smokeless tobacco: Never Used   Substance and Sexual Activity   • Alcohol use: No   • Drug use: No   • Sexual activity: Not on file   Other Topics Concern   • Not on file   Social History Narrative   • Not on file     Allergies   Allergen Reactions   • Acetaminophen Other (See Comments)     holucinations   • Codeine Unknown (See Comments)   • Eggs Or Egg-Derived Products Hives   • Latex    • Lidocaine    • Nickel Hives     Metals other than gold and silver       Current Outpatient Medications:   •  amLODIPine (NORVASC) 5 MG tablet, , Disp: , Rfl:   •  atorvastatin (LIPITOR) 40 MG tablet, Take 40 mg by mouth., Disp: , Rfl:   •  ELIQUIS 5 MG tablet tablet, , Disp: , Rfl:   •  furosemide (LASIX) 20 MG tablet, TAKE ONE TABLET BY MOUTH DAILY, Disp: , Rfl:   •  metoprolol tartrate (LOPRESSOR) 50 MG tablet, , Disp: , Rfl:   •  NYSTATIN 070996 UNIT/GM topical powder, , Disp: ,  "Rfl:   •  propylthiouracil (PTU) 50 MG tablet, Take 1 tablet by mouth Daily., Disp: 90 tablet, Rfl: 1        Review of Systems   Constitutional: Negative for chills, fatigue and fever.   Cardiovascular: Positive for palpitations. Negative for chest pain.   Gastrointestinal: Negative for abdominal pain, constipation, diarrhea, nausea and vomiting.   Endocrine: Negative for cold intolerance and heat intolerance.   All other systems reviewed and are negative.      Objective       Vitals:    12/17/18 1336   BP: 120/74   Pulse: 51   Weight: 91.8 kg (202 lb 6.4 oz)   Height: 160 cm (63\")     Body mass index is 35.85 kg/m².      Physical Exam   Constitutional: She is oriented to person, place, and time. She appears well-developed and well-nourished.   HENT:   Head: Normocephalic and atraumatic.   Eyes: EOM are normal. Pupils are equal, round, and reactive to light.   Neck: Normal range of motion. Neck supple. Thyromegaly present.   Cardiovascular: Normal rate, regular rhythm, normal heart sounds and intact distal pulses.   Pulmonary/Chest: Effort normal and breath sounds normal. She has no wheezes. She has no rales.   Abdominal: Soft. Bowel sounds are normal. She exhibits distension. There is no tenderness.   Musculoskeletal: Normal range of motion. She exhibits no edema.   Neurological: She is alert and oriented to person, place, and time. She has normal reflexes.   Skin: Skin is warm and dry. No rash noted.   Psychiatric: She has a normal mood and affect. Her behavior is normal.   Nursing note and vitals reviewed.    Results Review:     I reviewed the patient's new clinical results.    Medical records reviewed  Summary:      Lab on 12/10/2018   Component Date Value Ref Range Status   • TSH 12/10/2018 0.301  0.270 - 4.200 mIU/mL Final   • Free T4 12/10/2018 1.17  0.93 - 1.70 ng/dL Final   • Glucose 12/10/2018 87  65 - 99 mg/dL Final   • BUN 12/10/2018 21  8 - 23 mg/dL Final   • Creatinine 12/10/2018 0.79  0.57 - 1.00 " mg/dL Final   • eGFR Non  Am 12/10/2018 71  >60 mL/min/1.73 Final    Comment: The MDRD GFR formula is only valid for adults with stable  renal function between ages 18 and 70.     • eGFR  Am 12/10/2018 86  >60 mL/min/1.73 Final   • BUN/Creatinine Ratio 12/10/2018 26.6* 7.0 - 25.0 Final   • Sodium 12/10/2018 144  136 - 145 mmol/L Final   • Potassium 12/10/2018 4.9  3.5 - 5.2 mmol/L Final   • Chloride 12/10/2018 105  98 - 107 mmol/L Final   • Total CO2 12/10/2018 29.9* 22.0 - 29.0 mmol/L Final   • Calcium 12/10/2018 9.6  8.6 - 10.5 mg/dL Final   • Total Protein 12/10/2018 6.6  6.0 - 8.5 g/dL Final   • Albumin 12/10/2018 3.80  3.50 - 5.20 g/dL Final   • Globulin 12/10/2018 2.8  gm/dL Final   • A/G Ratio 12/10/2018 1.4  g/dL Final   • Total Bilirubin 12/10/2018 0.5  0.1 - 1.2 mg/dL Final   • Alkaline Phosphatase 12/10/2018 74  39 - 117 U/L Final   • AST (SGOT) 12/10/2018 22  1 - 32 U/L Final   • ALT (SGPT) 12/10/2018 19  1 - 33 U/L Final   • WBC 12/10/2018 7.12  4.50 - 10.70 10*3/mm3 Final   • RBC 12/10/2018 4.66  3.90 - 5.20 10*6/mm3 Final   • Hemoglobin 12/10/2018 13.9  11.9 - 15.5 g/dL Final   • Hematocrit 12/10/2018 42.8  35.6 - 45.5 % Final   • MCV 12/10/2018 91.8  80.5 - 98.2 fL Final   • MCH 12/10/2018 29.8  26.9 - 32.0 pg Final   • MCHC 12/10/2018 32.5  32.4 - 36.3 g/dL Final   • RDW 12/10/2018 13.6* 11.7 - 13.0 % Final   • Platelets 12/10/2018 185  140 - 500 10*3/mm3 Final   • Neutrophil Rel % 12/10/2018 43.3  42.7 - 76.0 % Final   • Lymphocyte Rel % 12/10/2018 42.0  19.6 - 45.3 % Final   • Monocyte Rel % 12/10/2018 11.4  5.0 - 12.0 % Final   • Eosinophil Rel % 12/10/2018 2.9  0.3 - 6.2 % Final   • Basophil Rel % 12/10/2018 0.4  0.0 - 1.5 % Final   • Neutrophils Absolute 12/10/2018 3.08  1.90 - 8.10 10*3/mm3 Final   • Lymphocytes Absolute 12/10/2018 2.99  0.90 - 4.80 10*3/mm3 Final   • Monocytes Absolute 12/10/2018 0.81  0.20 - 1.20 10*3/mm3 Final   • Eosinophils Absolute 12/10/2018 0.21  0.00 -  "0.70 10*3/mm3 Final   • Basophils Absolute 12/10/2018 0.03  0.00 - 0.20 10*3/mm3 Final   • Immature Granulocyte Rel % 12/10/2018 0.1  0.0 - 0.5 % Final   • Immature Grans Absolute 12/10/2018 0.01  0.00 - 0.03 10*3/mm3 Final     No results found for: HGBA1C  Lab Results   Component Value Date    CREATININE 0.79 12/10/2018     Imaging Results (most recent)     None          Assessment and Plan:    Edelmira was seen today for hyperthyroidism.    Diagnoses and all orders for this visit:    Abnormal weight gain    Hyperthyroidism  -     propylthiouracil (PTU) 50 MG tablet; Take 1 tablet by mouth Daily.    Chronic fatigue    Multinodular goiter    Patient denies any symptoms of hyper or hypothyroidism  She occasionally reports headaches in the back of the head  She's currently taking PTU 50 mg 1 tablet daily in the morning  Lab tests reviewed  Patient's TSH is 0.3 and free T4 is 1.1  Stable  Patient denies any side effects from medication    I advised the patient to continue the current dose of medication  Patient will return to follow-up in 6 months.    The total time spent  was more than 25 min of which greater than 15 min of time (greater than 50% of the total time)  was spent face to face with the patient counseling and coordination of care on recommended evaluation and treatment options, instructions for management/treatment and /or follow up and importance of compliance with chosen management or treatment options    Baudilio Pulido MD. FACE    12/17/18      EMR Dragon / transcription disclaimer:     \"Dictated utilizing Dragon dictation\".         "

## 2019-06-03 ENCOUNTER — RESULTS ENCOUNTER (OUTPATIENT)
Dept: ENDOCRINOLOGY | Age: 79
End: 2019-06-03

## 2019-06-03 DIAGNOSIS — E05.90 HYPERTHYROIDISM: ICD-10-CM

## 2019-06-03 DIAGNOSIS — E05.90 HYPERTHYROIDISM: Primary | ICD-10-CM

## 2019-06-05 LAB
ALBUMIN SERPL-MCNC: 3.7 G/DL (ref 3.5–5.2)
ALBUMIN/GLOB SERPL: 1.2 G/DL
ALP SERPL-CCNC: 72 U/L (ref 39–117)
ALT SERPL-CCNC: 18 U/L (ref 1–33)
AST SERPL-CCNC: 20 U/L (ref 1–32)
BASOPHILS # BLD AUTO: 0.05 10*3/MM3 (ref 0–0.2)
BASOPHILS NFR BLD AUTO: 0.6 % (ref 0–1.5)
BILIRUB SERPL-MCNC: 0.5 MG/DL (ref 0.2–1.2)
BUN SERPL-MCNC: 18 MG/DL (ref 8–23)
BUN/CREAT SERPL: 26.9 (ref 7–25)
CALCIUM SERPL-MCNC: 10 MG/DL (ref 8.6–10.5)
CHLORIDE SERPL-SCNC: 105 MMOL/L (ref 98–107)
CO2 SERPL-SCNC: 29.4 MMOL/L (ref 22–29)
CREAT SERPL-MCNC: 0.67 MG/DL (ref 0.57–1)
EOSINOPHIL # BLD AUTO: 0.27 10*3/MM3 (ref 0–0.4)
EOSINOPHIL NFR BLD AUTO: 3.5 % (ref 0.3–6.2)
ERYTHROCYTE [DISTWIDTH] IN BLOOD BY AUTOMATED COUNT: 13.2 % (ref 12.3–15.4)
GLOBULIN SER CALC-MCNC: 3 GM/DL
GLUCOSE SERPL-MCNC: 86 MG/DL (ref 65–99)
HCT VFR BLD AUTO: 45.3 % (ref 34–46.6)
HGB BLD-MCNC: 13.8 G/DL (ref 12–15.9)
IMM GRANULOCYTES # BLD AUTO: 0.02 10*3/MM3 (ref 0–0.05)
IMM GRANULOCYTES NFR BLD AUTO: 0.3 % (ref 0–0.5)
LYMPHOCYTES # BLD AUTO: 2.69 10*3/MM3 (ref 0.7–3.1)
LYMPHOCYTES NFR BLD AUTO: 34.8 % (ref 19.6–45.3)
MCH RBC QN AUTO: 28.9 PG (ref 26.6–33)
MCHC RBC AUTO-ENTMCNC: 30.5 G/DL (ref 31.5–35.7)
MCV RBC AUTO: 95 FL (ref 79–97)
MONOCYTES # BLD AUTO: 0.75 10*3/MM3 (ref 0.1–0.9)
MONOCYTES NFR BLD AUTO: 9.7 % (ref 5–12)
NEUTROPHILS # BLD AUTO: 3.95 10*3/MM3 (ref 1.7–7)
NEUTROPHILS NFR BLD AUTO: 51.1 % (ref 42.7–76)
NRBC BLD AUTO-RTO: 0 /100 WBC (ref 0–0.2)
PLATELET # BLD AUTO: 203 10*3/MM3 (ref 140–450)
POTASSIUM SERPL-SCNC: 4.5 MMOL/L (ref 3.5–5.2)
PROT SERPL-MCNC: 6.7 G/DL (ref 6–8.5)
RBC # BLD AUTO: 4.77 10*6/MM3 (ref 3.77–5.28)
SODIUM SERPL-SCNC: 143 MMOL/L (ref 136–145)
T4 FREE SERPL-MCNC: 1.18 NG/DL (ref 0.93–1.7)
TSH SERPL DL<=0.005 MIU/L-ACNC: 0.9 MIU/ML (ref 0.27–4.2)
WBC # BLD AUTO: 7.73 10*3/MM3 (ref 3.4–10.8)

## 2019-06-17 ENCOUNTER — OFFICE VISIT (OUTPATIENT)
Dept: ENDOCRINOLOGY | Age: 79
End: 2019-06-17

## 2019-06-17 VITALS
SYSTOLIC BLOOD PRESSURE: 130 MMHG | HEIGHT: 63 IN | HEART RATE: 54 BPM | WEIGHT: 197.8 LBS | BODY MASS INDEX: 35.05 KG/M2 | DIASTOLIC BLOOD PRESSURE: 80 MMHG

## 2019-06-17 DIAGNOSIS — R63.5 ABNORMAL WEIGHT GAIN: ICD-10-CM

## 2019-06-17 DIAGNOSIS — E04.2 MULTINODULAR GOITER: Primary | ICD-10-CM

## 2019-06-17 DIAGNOSIS — E05.90 HYPERTHYROIDISM: ICD-10-CM

## 2019-06-17 DIAGNOSIS — R53.82 CHRONIC FATIGUE: ICD-10-CM

## 2019-06-17 PROCEDURE — 99214 OFFICE O/P EST MOD 30 MIN: CPT | Performed by: INTERNAL MEDICINE

## 2019-06-17 RX ORDER — PROPYLTHIOURACIL 50 MG/1
50 TABLET ORAL DAILY
Qty: 90 TABLET | Refills: 1 | Status: SHIPPED | OUTPATIENT
Start: 2019-06-17 | End: 2019-12-17 | Stop reason: SDUPTHER

## 2019-12-03 DIAGNOSIS — E05.90 HYPERTHYROIDISM: Primary | ICD-10-CM

## 2019-12-03 DIAGNOSIS — E04.2 MULTINODULAR GOITER: ICD-10-CM

## 2019-12-03 DIAGNOSIS — E05.90 HYPERTHYROIDISM: ICD-10-CM

## 2019-12-04 LAB
BASOPHILS # BLD AUTO: 0.04 10*3/MM3 (ref 0–0.2)
BASOPHILS NFR BLD AUTO: 0.6 % (ref 0–1.5)
EOSINOPHIL # BLD AUTO: 0.21 10*3/MM3 (ref 0–0.4)
EOSINOPHIL NFR BLD AUTO: 3 % (ref 0.3–6.2)
ERYTHROCYTE [DISTWIDTH] IN BLOOD BY AUTOMATED COUNT: 13 % (ref 12.3–15.4)
HCT VFR BLD AUTO: 42.6 % (ref 34–46.6)
HGB BLD-MCNC: 14 G/DL (ref 12–15.9)
IMM GRANULOCYTES # BLD AUTO: 0.02 10*3/MM3 (ref 0–0.05)
IMM GRANULOCYTES NFR BLD AUTO: 0.3 % (ref 0–0.5)
LYMPHOCYTES # BLD AUTO: 2.34 10*3/MM3 (ref 0.7–3.1)
LYMPHOCYTES NFR BLD AUTO: 33.7 % (ref 19.6–45.3)
MCH RBC QN AUTO: 29.9 PG (ref 26.6–33)
MCHC RBC AUTO-ENTMCNC: 32.9 G/DL (ref 31.5–35.7)
MCV RBC AUTO: 90.8 FL (ref 79–97)
MONOCYTES # BLD AUTO: 0.78 10*3/MM3 (ref 0.1–0.9)
MONOCYTES NFR BLD AUTO: 11.2 % (ref 5–12)
NEUTROPHILS # BLD AUTO: 3.55 10*3/MM3 (ref 1.7–7)
NEUTROPHILS NFR BLD AUTO: 51.2 % (ref 42.7–76)
NRBC BLD AUTO-RTO: 0 /100 WBC (ref 0–0.2)
PLATELET # BLD AUTO: 198 10*3/MM3 (ref 140–450)
RBC # BLD AUTO: 4.69 10*6/MM3 (ref 3.77–5.28)
T4 FREE SERPL-MCNC: 1.19 NG/DL (ref 0.93–1.7)
TSH SERPL DL<=0.005 MIU/L-ACNC: 0.42 UIU/ML (ref 0.27–4.2)
WBC # BLD AUTO: 6.94 10*3/MM3 (ref 3.4–10.8)

## 2019-12-17 ENCOUNTER — OFFICE VISIT (OUTPATIENT)
Dept: ENDOCRINOLOGY | Age: 79
End: 2019-12-17

## 2019-12-17 VITALS
HEIGHT: 63 IN | HEART RATE: 63 BPM | DIASTOLIC BLOOD PRESSURE: 70 MMHG | WEIGHT: 203 LBS | BODY MASS INDEX: 35.97 KG/M2 | SYSTOLIC BLOOD PRESSURE: 126 MMHG

## 2019-12-17 DIAGNOSIS — E04.2 MULTINODULAR GOITER: Primary | ICD-10-CM

## 2019-12-17 DIAGNOSIS — R53.82 CHRONIC FATIGUE: ICD-10-CM

## 2019-12-17 DIAGNOSIS — E05.90 HYPERTHYROIDISM: ICD-10-CM

## 2019-12-17 DIAGNOSIS — R63.5 ABNORMAL WEIGHT GAIN: ICD-10-CM

## 2019-12-17 PROCEDURE — 99214 OFFICE O/P EST MOD 30 MIN: CPT | Performed by: INTERNAL MEDICINE

## 2019-12-17 RX ORDER — PROPYLTHIOURACIL 50 MG/1
50 TABLET ORAL 2 TIMES DAILY
Qty: 180 TABLET | Refills: 1 | COMMUNITY
Start: 2019-12-17 | End: 2020-03-13

## 2019-12-17 RX ORDER — TRIAMCINOLONE ACETONIDE 1 MG/G
CREAM TOPICAL
COMMUNITY
Start: 2019-05-27

## 2019-12-17 RX ORDER — ADAPALENE 45 G/G
GEL TOPICAL DAILY
COMMUNITY
Start: 2018-04-25 | End: 2023-02-27

## 2019-12-17 NOTE — PROGRESS NOTES
79 y.o.    Patient Care Team:  Nohelia Engel MD as PCP - General (Pediatrics)    Chief Complaint:      F/U HYPERTHYROIDISM.  HERE TO DISCUSS LAB RESULTS     Subjective     HPI   Patient is a 79-year-old female with a past history of multinodular goiter and hypothyroidism came for follow-up    Hypothyroidism  Patient is currently taking PTU 50 mg once tablet daily in the morning  She denies any side effects  Reports compliance  Abnormal weight gain  Patient currently weighs 203 pounds  Continues to gain weight gradually  She has been reporting palpitations and chest pain and was seen by cardiologist  Multinodular goiter  Patient had ultrasound in the past  She denies any difficulty swallowing or change in voice  Fatigue  Patient continues to experience fatigue  Patient was diagnosed with paroxysmal atrial fibrillation        Interval History      The following portions of the patient's history were reviewed and updated as appropriate: allergies, current medications, past family history, past medical history, past social history, past surgical history and problem list.    Past Medical History:   Diagnosis Date   • A-fib (CMS/[facility])    • Hyperlipidemia    • Hypertension    • Hyperthyroidism      Family History   Problem Relation Age of Onset   • Diabetes Mother    • Heart disease Father    • Heart attack Father      Social History     Socioeconomic History   • Marital status: Unknown     Spouse name: Not on file   • Number of children: Not on file   • Years of education: Not on file   • Highest education level: Not on file   Tobacco Use   • Smoking status: Never Smoker   • Smokeless tobacco: Never Used   Substance and Sexual Activity   • Alcohol use: No   • Drug use: No     Allergies   Allergen Reactions   • Acetaminophen Other (See Comments)     holucinations   • Codeine Unknown (See Comments)   • Eggs Or Egg-Derived Products Hives   • Latex    • Lidocaine    • Nickel Hives     Metals other than gold and silver  "      Current Outpatient Medications:   •  adapalene (DIFFERIN) 0.1 % gel, Apply  topically to the appropriate area as directed Daily., Disp: , Rfl:   •  aspirin 81 MG tablet, Take 162 mg by mouth Daily., Disp: , Rfl:   •  atorvastatin (LIPITOR) 40 MG tablet, Take 40 mg by mouth., Disp: , Rfl:   •  ELIQUIS 5 MG tablet tablet, , Disp: , Rfl:   •  furosemide (LASIX) 20 MG tablet, TAKE ONE TABLET BY MOUTH DAILY, Disp: , Rfl:   •  metoprolol tartrate (LOPRESSOR) 50 MG tablet, , Disp: , Rfl:   •  NYSTATIN [id number] UNIT/GM topical powder, , Disp: , Rfl:   •  propylthiouracil (PTU) 50 MG tablet, Take 1 tablet by mouth 2 (Two) Times a Day., Disp: 180 tablet, Rfl: 1  •  triamcinolone (KENALOG) 0.1 % cream, Apply  topically to the appropriate area as directed., Disp: , Rfl:         Review of Systems   Constitutional: Positive for fatigue. Negative for chills and fever.   Cardiovascular: Positive for palpitations. Negative for chest pain.   Gastrointestinal: Positive for constipation. Negative for abdominal pain, diarrhea, nausea and vomiting.   Endocrine: Negative for cold intolerance and heat intolerance.   All other systems reviewed and are negative.      Objective       Vitals:    12/17/19 1354   BP: 126/70   Pulse: 63   Weight: 92.1 kg (203 lb)   Height: 160 cm (63\")     Body mass index is 35.96 kg/m².      Physical Exam   Constitutional: She is oriented to person, place, and time. She appears well-developed and well-nourished.   HENT:   Head: Normocephalic and atraumatic.   Eyes: Pupils are equal, round, and reactive to light. EOM are normal.   Neck: Normal range of motion. Neck supple. Thyromegaly present.   Cardiovascular: Normal rate, regular rhythm, normal heart sounds and intact distal pulses.   Pulmonary/Chest: Effort normal and breath sounds normal. She has no wheezes. She has no rales.   Abdominal: Soft. Bowel sounds are normal. She exhibits distension. There is no tenderness.   Musculoskeletal: Normal range of " motion. She exhibits no edema.   Neurological: She is alert and oriented to person, place, and time. She has normal reflexes.   Skin: Skin is warm and dry. No rash noted.   Psychiatric: She has a normal mood and affect. Her behavior is normal.   Nursing note and vitals reviewed.    Results Review:     I reviewed the patient's new clinical results.    Medical records reviewed  Summary:  Cardiology notes reviewed  Patient was kept on metoprolol for rate control      Orders Only on 12/03/2019   Component Date Value Ref Range Status   • WBC 12/03/2019 6.94  3.40 - 10.80 10*3/mm3 Final   • RBC 12/03/2019 4.69  3.77 - 5.28 10*6/mm3 Final   • Hemoglobin 12/03/2019 14.0  12.0 - 15.9 g/dL Final   • Hematocrit 12/03/2019 42.6  34.0 - 46.6 % Final   • MCV 12/03/2019 90.8  79.0 - 97.0 fL Final   • MCH 12/03/2019 29.9  26.6 - 33.0 pg Final   • MCHC 12/03/2019 32.9  31.5 - 35.7 g/dL Final   • RDW 12/03/2019 13.0  12.3 - 15.4 % Final   • Platelets 12/03/2019 198  140 - 450 10*3/mm3 Final   • Neutrophil Rel % 12/03/2019 51.2  42.7 - 76.0 % Final   • Lymphocyte Rel % 12/03/2019 33.7  19.6 - 45.3 % Final   • Monocyte Rel % 12/03/2019 11.2  5.0 - 12.0 % Final   • Eosinophil Rel % 12/03/2019 3.0  0.3 - 6.2 % Final   • Basophil Rel % 12/03/2019 0.6  0.0 - 1.5 % Final   • Neutrophils Absolute 12/03/2019 3.55  1.70 - 7.00 10*3/mm3 Final   • Lymphocytes Absolute 12/03/2019 2.34  0.70 - 3.10 10*3/mm3 Final   • Monocytes Absolute 12/03/2019 0.78  0.10 - 0.90 10*3/mm3 Final   • Eosinophils Absolute 12/03/2019 0.21  0.00 - 0.40 10*3/mm3 Final   • Basophils Absolute 12/03/2019 0.04  0.00 - 0.20 10*3/mm3 Final   • Immature Granulocyte Rel % 12/03/2019 0.3  0.0 - 0.5 % Final   • Immature Grans Absolute 12/03/2019 0.02  0.00 - 0.05 10*3/mm3 Final   • nRBC 12/03/2019 0.0  0.0 - 0.2 /100 WBC Final   • TSH 12/03/2019 0.423  0.270 - 4.200 uIU/mL Final   • Free T4 12/03/2019 1.19  0.93 - 1.70 ng/dL Final     No results found for: HGBA1C  Lab Results  "  Component Value Date    CREATININE 0.67 06/04/2019     Imaging Results (Most Recent)     None          Assessment and Plan:    Edelmira was seen today for hyperthyroidism.    Diagnoses and all orders for this visit:    Multinodular goiter    Hyperthyroidism  -     propylthiouracil (PTU) 50 MG tablet; Take 1 tablet by mouth 2 (Two) Times a Day.    Abnormal weight gain    Chronic fatigue      Patient is currently taking PTU 50 mg 1 tablet daily in the morning  Denies any side effects  She does report occasional headaches but she reported headaches even when she was on higher dose  Patient's TSH is 0.4 and free T4 is 1.1  Her TSH used to be 0.8    I recommend increasing PTU 1 tablet twice daily  Patient's heart rate has improved since last visit  I suspect metoprolol may be causing the headaches  I advised the patient to discuss with the primary care/cardiology    Patient return to follow-up in 6 months      Baudilio Pulido MD. FACE    12/17/19      EMR Dragon / transcription disclaimer:     \"Dictated utilizing Dragon dictation\".         "

## 2020-03-13 DIAGNOSIS — E05.90 HYPERTHYROIDISM: ICD-10-CM

## 2020-03-13 RX ORDER — PROPYLTHIOURACIL 50 MG/1
TABLET ORAL
Qty: 90 TABLET | Refills: 0 | Status: SHIPPED | OUTPATIENT
Start: 2020-03-13 | End: 2020-06-11

## 2020-06-11 DIAGNOSIS — E05.90 HYPERTHYROIDISM: ICD-10-CM

## 2020-06-11 RX ORDER — PROPYLTHIOURACIL 50 MG/1
TABLET ORAL
Qty: 90 TABLET | Refills: 0 | Status: SHIPPED | OUTPATIENT
Start: 2020-06-11 | End: 2020-06-18

## 2020-06-18 ENCOUNTER — OFFICE VISIT (OUTPATIENT)
Dept: ENDOCRINOLOGY | Age: 80
End: 2020-06-18

## 2020-06-18 VITALS
HEART RATE: 51 BPM | SYSTOLIC BLOOD PRESSURE: 130 MMHG | BODY MASS INDEX: 35.51 KG/M2 | WEIGHT: 200.4 LBS | DIASTOLIC BLOOD PRESSURE: 70 MMHG | OXYGEN SATURATION: 94 % | HEIGHT: 63 IN | RESPIRATION RATE: 20 BRPM

## 2020-06-18 DIAGNOSIS — E04.2 MULTINODULAR GOITER: ICD-10-CM

## 2020-06-18 DIAGNOSIS — R53.82 CHRONIC FATIGUE: ICD-10-CM

## 2020-06-18 DIAGNOSIS — E05.90 HYPERTHYROIDISM: Primary | ICD-10-CM

## 2020-06-18 DIAGNOSIS — R63.5 ABNORMAL WEIGHT GAIN: ICD-10-CM

## 2020-06-18 PROCEDURE — 99214 OFFICE O/P EST MOD 30 MIN: CPT | Performed by: INTERNAL MEDICINE

## 2020-06-18 RX ORDER — FUROSEMIDE 20 MG/1
TABLET ORAL
COMMUNITY
Start: 2020-06-09

## 2020-06-18 RX ORDER — METOPROLOL TARTRATE 50 MG/1
TABLET, FILM COATED ORAL
COMMUNITY
Start: 2020-02-12

## 2020-06-18 RX ORDER — ALBUTEROL SULFATE 90 UG/1
2 AEROSOL, METERED RESPIRATORY (INHALATION)
COMMUNITY
Start: 2020-03-26

## 2020-06-18 RX ORDER — PROPYLTHIOURACIL 50 MG/1
100 TABLET ORAL DAILY
COMMUNITY
Start: 2016-11-17 | End: 2020-06-18

## 2020-06-18 RX ORDER — PROPYLTHIOURACIL 50 MG/1
50 TABLET ORAL DAILY
Qty: 90 TABLET | Refills: 1 | Status: SHIPPED | OUTPATIENT
Start: 2020-06-18 | End: 2022-05-03

## 2020-06-18 RX ORDER — ATORVASTATIN CALCIUM 40 MG/1
40 TABLET, FILM COATED ORAL DAILY
COMMUNITY

## 2020-06-18 NOTE — PROGRESS NOTES
79 y.o.    Patient Care Team:  Nohelia Engel MD as PCP - General (Pediatrics)    Chief Complaint:   Pt here for fup on hypothyroid dz chronic fatigue abn wt gain   Subjective     HPI   Patient is a 79-year-old white female with a history of multinodular goiter and hyperthyroidism came for follow-up  Hyperthyroidism  Patient is currently taking PTU 50 mg 1 tablet daily in the morning  She reports compliance  Denies side effects  She does however report to some itchy papules for a very long time on the shoulders and arms  She also has multiple bruises but she is on aspirin  Abnormal weight gain  Patient currently weighs 200 pounds  She appears her last about 3 pounds since last visit  Patient denies any palpitations at this time  Multinodular goiter  Patient had ultrasound in the past  She denies any difficulty swallowing or change in voice  Chronic fatigue  Patient reports significant fatigue for a very long time  Patient was diagnosed with paroxysmal atrial fibrillation      Interval History      The following portions of the patient's history were reviewed and updated as appropriate: allergies, current medications, past family history, past medical history, past social history, past surgical history and problem list.    Past Medical History:   Diagnosis Date   • A-fib (CMS/[facility])    • Hyperlipidemia    • Hypertension    • Hyperthyroidism      Family History   Problem Relation Age of Onset   • Diabetes Mother    • Heart disease Father    • Heart attack Father      Social History     Socioeconomic History   • Marital status: Unknown     Spouse name: Not on file   • Number of children: Not on file   • Years of education: Not on file   • Highest education level: Not on file   Tobacco Use   • Smoking status: Never Smoker   • Smokeless tobacco: Never Used   Substance and Sexual Activity   • Alcohol use: No   • Drug use: No     Allergies   Allergen Reactions   • Acetaminophen Other (See Comments)     holucinations   •  Codeine Unknown (See Comments)   • Eggs Or Egg-Derived Products Hives   • Latex    • Lidocaine    • Nickel Hives     Metals other than gold and silver   • Potassium GI Intolerance     Causes joint/ bone pain       Current Outpatient Medications:   •  adapalene (DIFFERIN) 0.1 % gel, Apply  topically to the appropriate area as directed Daily., Disp: , Rfl:   •  albuterol sulfate  (90 Base) MCG/ACT inhaler, Inhale 2 puffs., Disp: , Rfl:   •  apixaban (Eliquis) 5 MG tablet tablet, TAKE ONE TABLET BY MOUTH TWICE A DAY, Disp: , Rfl:   •  aspirin 81 MG tablet, Take 162 mg by mouth Daily., Disp: , Rfl:   •  atorvastatin (LIPITOR) 40 MG tablet, Take 40 mg by mouth., Disp: , Rfl:   •  ELIQUIS 5 MG tablet tablet, , Disp: , Rfl:   •  furosemide (LASIX) 20 MG tablet, TAKE ONE TABLET BY MOUTH DAILY, Disp: , Rfl:   •  furosemide (LASIX) 20 MG tablet, TAKE ONE TABLET BY MOUTH TWICE DAILY, Disp: , Rfl:   •  metoprolol tartrate (LOPRESSOR) 50 MG tablet, TAKE ONE TABLET BY MOUTH TWICE A DAY, Disp: , Rfl:   •  NYSTATIN [id number] UNIT/GM topical powder, , Disp: , Rfl:   •  propylthiouracil (PTU) 50 MG tablet, Take 1 tablet by mouth Daily., Disp: 90 tablet, Rfl: 1  •  triamcinolone (KENALOG) 0.1 % cream, Apply  topically to the appropriate area as directed., Disp: , Rfl:   •  atorvastatin (LIPITOR) 40 MG tablet, Take 40 mg by mouth Daily., Disp: , Rfl:   •  metoprolol tartrate (LOPRESSOR) 50 MG tablet, , Disp: , Rfl:         Review of Systems   Constitutional: Positive for fatigue and unexpected weight change.   HENT: Negative.    Eyes: Negative.    Respiratory: Positive for shortness of breath.    Cardiovascular: Positive for palpitations and leg swelling.   Gastrointestinal: Negative.    Endocrine: Negative.    Genitourinary: Negative.    Musculoskeletal: Negative.    Skin: Negative.    Allergic/Immunologic: Negative.    Neurological: Negative.    Hematological: Negative.    Psychiatric/Behavioral: Negative.        Objective  "      Vitals:    06/18/20 1432   BP: 130/70   Pulse: 51   Resp: 20   SpO2: 94%   Weight: 90.9 kg (200 lb 6.4 oz)   Height: 160 cm (63\")     Body mass index is 35.5 kg/m².      Physical Exam  Results Review:     I reviewed the patient's new clinical results.    Medical records reviewed  Summary:      Orders Only on 12/03/2019   Component Date Value Ref Range Status   • WBC 12/03/2019 6.94  3.40 - 10.80 10*3/mm3 Final   • RBC 12/03/2019 4.69  3.77 - 5.28 10*6/mm3 Final   • Hemoglobin 12/03/2019 14.0  12.0 - 15.9 g/dL Final   • Hematocrit 12/03/2019 42.6  34.0 - 46.6 % Final   • MCV 12/03/2019 90.8  79.0 - 97.0 fL Final   • MCH 12/03/2019 29.9  26.6 - 33.0 pg Final   • MCHC 12/03/2019 32.9  31.5 - 35.7 g/dL Final   • RDW 12/03/2019 13.0  12.3 - 15.4 % Final   • Platelets 12/03/2019 198  140 - 450 10*3/mm3 Final   • Neutrophil Rel % 12/03/2019 51.2  42.7 - 76.0 % Final   • Lymphocyte Rel % 12/03/2019 33.7  19.6 - 45.3 % Final   • Monocyte Rel % 12/03/2019 11.2  5.0 - 12.0 % Final   • Eosinophil Rel % 12/03/2019 3.0  0.3 - 6.2 % Final   • Basophil Rel % 12/03/2019 0.6  0.0 - 1.5 % Final   • Neutrophils Absolute 12/03/2019 3.55  1.70 - 7.00 10*3/mm3 Final   • Lymphocytes Absolute 12/03/2019 2.34  0.70 - 3.10 10*3/mm3 Final   • Monocytes Absolute 12/03/2019 0.78  0.10 - 0.90 10*3/mm3 Final   • Eosinophils Absolute 12/03/2019 0.21  0.00 - 0.40 10*3/mm3 Final   • Basophils Absolute 12/03/2019 0.04  0.00 - 0.20 10*3/mm3 Final   • Immature Granulocyte Rel % 12/03/2019 0.3  0.0 - 0.5 % Final   • Immature Grans Absolute 12/03/2019 0.02  0.00 - 0.05 10*3/mm3 Final   • nRBC 12/03/2019 0.0  0.0 - 0.2 /100 WBC Final   • TSH 12/03/2019 0.423  0.270 - 4.200 uIU/mL Final   • Free T4 12/03/2019 1.19  0.93 - 1.70 ng/dL Final     No results found for: HGBA1C  Lab Results   Component Value Date    CREATININE 0.83 06/18/2020     Imaging Results (Most Recent)     None                Assessment and Plan:    Edelmira was seen today for " "hypothyroidism and fatigue.    Diagnoses and all orders for this visit:    Hyperthyroidism  -     TSH  -     T4, Free  -     CBC & Differential  -     Comprehensive Metabolic Panel  -     propylthiouracil (PTU) 50 MG tablet; Take 1 tablet by mouth Daily.    Multinodular goiter    Abnormal weight gain    Chronic fatigue    Other orders  -     T4, Free  -     TSH      Patient will get lab testing done today  After lab results reviewed she will be notified of any further changes in medication  I doubt very much if the PTH is causing this trouble  Patient has previously reacted to methimazole so cannot be used    She will return to follow-up in 6 months    Baudilio Pulido MD. FACE    06/20/20      EMR Dragon / transcription disclaimer:     \"Dictated utilizing Dragon dictation\".         "

## 2020-06-19 LAB
ALBUMIN SERPL-MCNC: 4.2 G/DL (ref 3.5–5.2)
ALBUMIN/GLOB SERPL: 1.4 G/DL
ALP SERPL-CCNC: 67 U/L (ref 39–117)
ALT SERPL-CCNC: 21 U/L (ref 1–33)
AST SERPL-CCNC: 23 U/L (ref 1–32)
BASOPHILS # BLD AUTO: 0.04 10*3/MM3 (ref 0–0.2)
BASOPHILS NFR BLD AUTO: 0.6 % (ref 0–1.5)
BILIRUB SERPL-MCNC: 0.7 MG/DL (ref 0.2–1.2)
BUN SERPL-MCNC: 14 MG/DL (ref 8–23)
BUN/CREAT SERPL: 16.9 (ref 7–25)
CALCIUM SERPL-MCNC: 10.1 MG/DL (ref 8.6–10.5)
CHLORIDE SERPL-SCNC: 103 MMOL/L (ref 98–107)
CO2 SERPL-SCNC: 31.3 MMOL/L (ref 22–29)
CREAT SERPL-MCNC: 0.83 MG/DL (ref 0.57–1)
EOSINOPHIL # BLD AUTO: 0.2 10*3/MM3 (ref 0–0.4)
EOSINOPHIL NFR BLD AUTO: 2.8 % (ref 0.3–6.2)
ERYTHROCYTE [DISTWIDTH] IN BLOOD BY AUTOMATED COUNT: 13.7 % (ref 12.3–15.4)
GLOBULIN SER CALC-MCNC: 3 GM/DL
GLUCOSE SERPL-MCNC: 93 MG/DL (ref 65–99)
HCT VFR BLD AUTO: 43.3 % (ref 34–46.6)
HGB BLD-MCNC: 14.4 G/DL (ref 12–15.9)
IMM GRANULOCYTES # BLD AUTO: 0.01 10*3/MM3 (ref 0–0.05)
IMM GRANULOCYTES NFR BLD AUTO: 0.1 % (ref 0–0.5)
LYMPHOCYTES # BLD AUTO: 2.67 10*3/MM3 (ref 0.7–3.1)
LYMPHOCYTES NFR BLD AUTO: 37.2 % (ref 19.6–45.3)
MCH RBC QN AUTO: 30 PG (ref 26.6–33)
MCHC RBC AUTO-ENTMCNC: 33.3 G/DL (ref 31.5–35.7)
MCV RBC AUTO: 90.2 FL (ref 79–97)
MONOCYTES # BLD AUTO: 0.72 10*3/MM3 (ref 0.1–0.9)
MONOCYTES NFR BLD AUTO: 10 % (ref 5–12)
NEUTROPHILS # BLD AUTO: 3.54 10*3/MM3 (ref 1.7–7)
NEUTROPHILS NFR BLD AUTO: 49.3 % (ref 42.7–76)
NRBC BLD AUTO-RTO: 0 /100 WBC (ref 0–0.2)
PLATELET # BLD AUTO: 190 10*3/MM3 (ref 140–450)
POTASSIUM SERPL-SCNC: 4.7 MMOL/L (ref 3.5–5.2)
PROT SERPL-MCNC: 7.2 G/DL (ref 6–8.5)
RBC # BLD AUTO: 4.8 10*6/MM3 (ref 3.77–5.28)
SODIUM SERPL-SCNC: 141 MMOL/L (ref 136–145)
T4 FREE SERPL-MCNC: 1.25 NG/DL (ref 0.93–1.7)
TSH SERPL DL<=0.005 MIU/L-ACNC: 0.29 UIU/ML (ref 0.27–4.2)
WBC # BLD AUTO: 7.18 10*3/MM3 (ref 3.4–10.8)

## 2021-01-20 ENCOUNTER — OFFICE VISIT (OUTPATIENT)
Dept: ENDOCRINOLOGY | Age: 81
End: 2021-01-20

## 2021-01-20 VITALS
WEIGHT: 205.4 LBS | BODY MASS INDEX: 36.39 KG/M2 | DIASTOLIC BLOOD PRESSURE: 92 MMHG | SYSTOLIC BLOOD PRESSURE: 146 MMHG | RESPIRATION RATE: 16 BRPM | HEIGHT: 63 IN

## 2021-01-20 DIAGNOSIS — E05.90 HYPERTHYROIDISM: Primary | ICD-10-CM

## 2021-01-20 PROCEDURE — 99214 OFFICE O/P EST MOD 30 MIN: CPT | Performed by: NURSE PRACTITIONER

## 2021-01-20 NOTE — PROGRESS NOTES
"Chief Complaint  Hyperlipidemia, Goiter, and Hyperthyroidism    Subjective          Edelmira Gerber presents to [facility] ENDOCRINOLOGY for     Son  in September     History of Present Illness  Patient is a 80-year-old white female with a history of multinodular goiter and hyperthyroidism     Hyperthyroidism  Patient is currently taking PTU 50 mg 1 tablet daily in the morning for about 4 years.  She is having a hard time with hair loss, dry skin and random rashes  She has afib which causes SOA  She is tired all the time   She reports compliance  Denies vision changes      Multinodular goiter  Patient had ultrasound in the past  She denies any difficulty swallowing or change in voice      Hyperlipidemia   atorvastatin  40mg daily  Reports compliance     Review of Systems   Constitutional: Positive for activity change and fatigue. Negative for appetite change.   HENT: Negative for sore throat, trouble swallowing and voice change.    Eyes: Positive for redness. Negative for visual disturbance.   Respiratory: Positive for shortness of breath. Negative for cough and choking.    Cardiovascular: Positive for palpitations. Negative for chest pain and leg swelling.   Gastrointestinal: Positive for constipation. Negative for abdominal pain, diarrhea, nausea and vomiting.   Endocrine: Negative for cold intolerance, heat intolerance, polydipsia, polyphagia and polyuria.   Genitourinary: Negative for decreased urine volume, dysuria, flank pain and urgency.   Musculoskeletal: Negative for arthralgias, gait problem and myalgias.   Skin: Negative for color change, rash and wound.   Neurological: Negative for dizziness, weakness, light-headedness, numbness and headaches.   Hematological: Does not bruise/bleed easily.         Objective      Vital Signs:   /92   Resp 16   Ht 160 cm (63\")   Wt 93.2 kg (205 lb 6.4 oz)   BMI 36.38 kg/m²     Physical Exam  Vitals signs reviewed.   Constitutional:       " General: She is not in acute distress.  HENT:      Head: Normocephalic and atraumatic.   Neck:      Musculoskeletal: Normal range of motion and neck supple.   Cardiovascular:      Rate and Rhythm: Normal rate and regular rhythm.   Pulmonary:      Effort: Pulmonary effort is normal. No respiratory distress.   Musculoskeletal: Normal range of motion.         General: No signs of injury.   Skin:     General: Skin is warm and dry.   Neurological:      Mental Status: She is alert and oriented to person, place, and time. Mental status is at baseline.   Psychiatric:         Mood and Affect: Mood normal.         Behavior: Behavior normal.         Thought Content: Thought content normal.         Judgment: Judgment normal.        Result Review :   The following data was reviewed by: YESSENIA Lopez on 01/20/2021:  Common labs    Common Labsle 3/9/20 3/9/20 6/18/20 6/18/20 9/22/20 9/22/20    0450 0450 1535 1535 1344 1344   Glucose  92  93 82    BUN  18  14 19    Creatinine  0.7  0.83 0.70    eGFR Non African Am    66     eGFR African Am    81     Sodium  138  141 143    Potassium  3.4 (A)  4.7 4.1    Chloride  101  103 104    Calcium  9.4  10.1 9.7    Total Protein    7.2     Albumin  3.9  4.20 3.8    Total Bilirubin  0.7  0.7 0.6    Alkaline Phosphatase  68  67 64    AST (SGOT)  31  23 36    ALT (SGPT)  21  21 26    WBC 5.91  7.18   6.22   Hemoglobin 13.5  14.4   14.0   Hematocrit 41.5  43.3   45.6   Platelets 156  190   182   (A) Abnormal value       Comments are available for some flowsheets but are not being displayed.                     Assessment and Plan    Problem List Items Addressed This Visit        Other    Hyperthyroidism - Primary    Relevant Orders    TSH    T4, Free    T3, Free    Comprehensive Metabolic Panel    CBC & Differential    TSH    T4, Free    T3, Free    Comprehensive Metabolic Panel    CBC & Differential          Follow Up   Return in about 6 months (around 7/20/2021).     ?thyroid u/s in the  future after next visit with dr stuart   ? JARA ablation vs continue PTU  Repeat labs  Needs close monitoring to reduce risk of complications from over or under treatment with thyroid blocking hormone  Higher risk of complications related to h/o afib       Patient was given instructions and counseling regarding her condition or for health maintenance advice. Please see specific information pulled into the AVS if appropriate.

## 2021-01-21 LAB
ALBUMIN SERPL-MCNC: 3.9 G/DL (ref 3.5–5.2)
ALBUMIN/GLOB SERPL: 1.3 G/DL
ALP SERPL-CCNC: 66 U/L (ref 39–117)
ALT SERPL-CCNC: 22 U/L (ref 1–33)
AST SERPL-CCNC: 28 U/L (ref 1–32)
BASOPHILS # BLD AUTO: 0.04 10*3/MM3 (ref 0–0.2)
BASOPHILS NFR BLD AUTO: 0.6 % (ref 0–1.5)
BILIRUB SERPL-MCNC: 0.4 MG/DL (ref 0–1.2)
BUN SERPL-MCNC: 18 MG/DL (ref 8–23)
BUN/CREAT SERPL: 25.4 (ref 7–25)
CALCIUM SERPL-MCNC: 9.6 MG/DL (ref 8.6–10.5)
CHLORIDE SERPL-SCNC: 102 MMOL/L (ref 98–107)
CO2 SERPL-SCNC: 32 MMOL/L (ref 22–29)
CREAT SERPL-MCNC: 0.71 MG/DL (ref 0.57–1)
EOSINOPHIL # BLD AUTO: 0.26 10*3/MM3 (ref 0–0.4)
EOSINOPHIL NFR BLD AUTO: 3.9 % (ref 0.3–6.2)
ERYTHROCYTE [DISTWIDTH] IN BLOOD BY AUTOMATED COUNT: 13.1 % (ref 12.3–15.4)
GLOBULIN SER CALC-MCNC: 2.9 GM/DL
GLUCOSE SERPL-MCNC: 98 MG/DL (ref 65–99)
HCT VFR BLD AUTO: 41.3 % (ref 34–46.6)
HGB BLD-MCNC: 13.8 G/DL (ref 12–15.9)
IMM GRANULOCYTES # BLD AUTO: 0.01 10*3/MM3 (ref 0–0.05)
IMM GRANULOCYTES NFR BLD AUTO: 0.1 % (ref 0–0.5)
LYMPHOCYTES # BLD AUTO: 2.15 10*3/MM3 (ref 0.7–3.1)
LYMPHOCYTES NFR BLD AUTO: 32.2 % (ref 19.6–45.3)
MCH RBC QN AUTO: 29.9 PG (ref 26.6–33)
MCHC RBC AUTO-ENTMCNC: 33.4 G/DL (ref 31.5–35.7)
MCV RBC AUTO: 89.6 FL (ref 79–97)
MONOCYTES # BLD AUTO: 0.94 10*3/MM3 (ref 0.1–0.9)
MONOCYTES NFR BLD AUTO: 14.1 % (ref 5–12)
NEUTROPHILS # BLD AUTO: 3.28 10*3/MM3 (ref 1.7–7)
NEUTROPHILS NFR BLD AUTO: 49.1 % (ref 42.7–76)
NRBC BLD AUTO-RTO: 0 /100 WBC (ref 0–0.2)
PLATELET # BLD AUTO: 174 10*3/MM3 (ref 140–450)
POTASSIUM SERPL-SCNC: 4.1 MMOL/L (ref 3.5–5.2)
PROT SERPL-MCNC: 6.8 G/DL (ref 6–8.5)
RBC # BLD AUTO: 4.61 10*6/MM3 (ref 3.77–5.28)
SODIUM SERPL-SCNC: 142 MMOL/L (ref 136–145)
T3FREE SERPL-MCNC: 3.6 PG/ML (ref 2–4.4)
T4 FREE SERPL-MCNC: 1.14 NG/DL (ref 0.93–1.7)
TSH SERPL DL<=0.005 MIU/L-ACNC: 0.5 UIU/ML (ref 0.27–4.2)
WBC # BLD AUTO: 6.68 10*3/MM3 (ref 3.4–10.8)

## 2021-08-14 LAB
ALBUMIN SERPL-MCNC: 5 G/DL (ref 3.5–5.2)
ALBUMIN/GLOB SERPL: 2.6 G/DL
ALP SERPL-CCNC: 66 U/L (ref 39–117)
ALT SERPL-CCNC: 17 U/L (ref 1–33)
AST SERPL-CCNC: 25 U/L (ref 1–32)
BASOPHILS # BLD AUTO: 0.06 10*3/MM3 (ref 0–0.2)
BASOPHILS NFR BLD AUTO: 0.7 % (ref 0–1.5)
BILIRUB SERPL-MCNC: 0.4 MG/DL (ref 0–1.2)
BUN SERPL-MCNC: 30 MG/DL (ref 8–23)
BUN/CREAT SERPL: 33.7 (ref 7–25)
CALCIUM SERPL-MCNC: 10.2 MG/DL (ref 8.6–10.5)
CHLORIDE SERPL-SCNC: 105 MMOL/L (ref 98–107)
CO2 SERPL-SCNC: 28.9 MMOL/L (ref 22–29)
CREAT SERPL-MCNC: 0.89 MG/DL (ref 0.57–1)
EOSINOPHIL # BLD AUTO: 0.22 10*3/MM3 (ref 0–0.4)
EOSINOPHIL NFR BLD AUTO: 2.6 % (ref 0.3–6.2)
ERYTHROCYTE [DISTWIDTH] IN BLOOD BY AUTOMATED COUNT: 13.4 % (ref 12.3–15.4)
GLOBULIN SER CALC-MCNC: 1.9 GM/DL
GLUCOSE SERPL-MCNC: 86 MG/DL (ref 65–99)
HCT VFR BLD AUTO: 45.7 % (ref 34–46.6)
HGB BLD-MCNC: 14.7 G/DL (ref 12–15.9)
IMM GRANULOCYTES # BLD AUTO: 0.03 10*3/MM3 (ref 0–0.05)
IMM GRANULOCYTES NFR BLD AUTO: 0.4 % (ref 0–0.5)
LYMPHOCYTES # BLD AUTO: 3.58 10*3/MM3 (ref 0.7–3.1)
LYMPHOCYTES NFR BLD AUTO: 43.1 % (ref 19.6–45.3)
MCH RBC QN AUTO: 28.9 PG (ref 26.6–33)
MCHC RBC AUTO-ENTMCNC: 32.2 G/DL (ref 31.5–35.7)
MCV RBC AUTO: 90 FL (ref 79–97)
MONOCYTES # BLD AUTO: 0.99 10*3/MM3 (ref 0.1–0.9)
MONOCYTES NFR BLD AUTO: 11.9 % (ref 5–12)
NEUTROPHILS # BLD AUTO: 3.43 10*3/MM3 (ref 1.7–7)
NEUTROPHILS NFR BLD AUTO: 41.3 % (ref 42.7–76)
NRBC BLD AUTO-RTO: 0 /100 WBC (ref 0–0.2)
PLATELET # BLD AUTO: 184 10*3/MM3 (ref 140–450)
POTASSIUM SERPL-SCNC: 4.8 MMOL/L (ref 3.5–5.2)
PROT SERPL-MCNC: 6.9 G/DL (ref 6–8.5)
RBC # BLD AUTO: 5.08 10*6/MM3 (ref 3.77–5.28)
SODIUM SERPL-SCNC: 142 MMOL/L (ref 136–145)
T3FREE SERPL-MCNC: 3.3 PG/ML (ref 2–4.4)
T4 FREE SERPL-MCNC: 1.12 NG/DL (ref 0.93–1.7)
TSH SERPL DL<=0.005 MIU/L-ACNC: 0.53 UIU/ML (ref 0.27–4.2)
WBC # BLD AUTO: 8.31 10*3/MM3 (ref 3.4–10.8)

## 2021-08-20 ENCOUNTER — OFFICE VISIT (OUTPATIENT)
Dept: ENDOCRINOLOGY | Age: 81
End: 2021-08-20

## 2021-08-20 VITALS
DIASTOLIC BLOOD PRESSURE: 80 MMHG | HEIGHT: 63 IN | BODY MASS INDEX: 35.19 KG/M2 | HEART RATE: 70 BPM | OXYGEN SATURATION: 95 % | SYSTOLIC BLOOD PRESSURE: 136 MMHG | WEIGHT: 198.6 LBS

## 2021-08-20 DIAGNOSIS — E04.2 MULTINODULAR GOITER: ICD-10-CM

## 2021-08-20 DIAGNOSIS — E05.90 HYPERTHYROIDISM: Primary | ICD-10-CM

## 2021-08-20 PROCEDURE — 99214 OFFICE O/P EST MOD 30 MIN: CPT | Performed by: INTERNAL MEDICINE

## 2021-08-20 NOTE — PROGRESS NOTES
"Chief Complaint  Chief Complaint   Patient presents with   • Hyperthyroidism     FOLLOW UP/ HYPERTHYROIDISM  Subjective          History of Present Illness    Edelmira Gerber 80 y.o. presents as a follow-up for the evaluation of multinodular goiter and hyperthyroidism.    Today in clinic patient reports she is on PTU 50 mg 1 tablet a day.  Could not tolerate methimazole in the past.  She does complain of hair loss secondary to the PTU.  Does have history of atrial fibrillation, shortness of breath.    Today in clinic she reports that her energy levels are poor, weight has been stable.  No complaints of difficulty in swallowing or change in voice.    I have reviewed the patient's allergies, medicines, past medical hx, family hx and social hx in detail.    Objective   Vital Signs:   /80   Pulse 70   Ht 158.8 cm (62.5\")   Wt 90.1 kg (198 lb 9.6 oz)   SpO2 95%   BMI 35.75 kg/m²   Physical Exam   General appearance - no distress  Eyes- anicteric sclera  Ear nose and throat-external ears and nose normal.    Respiratory-normal chest on inspection.  No respiratory distress noted.  Skin-no rashes.  Neuro-alert and oriented x3          Result Review :   The following data was reviewed by: Nikia Solano MD on 08/20/2021:  Orders Only on 08/13/2021   Component Date Value Ref Range Status   • WBC 08/13/2021 8.31  3.40 - 10.80 10*3/mm3 Final   • RBC 08/13/2021 5.08  3.77 - 5.28 10*6/mm3 Final   • Hemoglobin 08/13/2021 14.7  12.0 - 15.9 g/dL Final   • Hematocrit 08/13/2021 45.7  34.0 - 46.6 % Final   • MCV 08/13/2021 90.0  79.0 - 97.0 fL Final   • MCH 08/13/2021 28.9  26.6 - 33.0 pg Final   • MCHC 08/13/2021 32.2  31.5 - 35.7 g/dL Final   • RDW 08/13/2021 13.4  12.3 - 15.4 % Final   • Platelets 08/13/2021 184  140 - 450 10*3/mm3 Final   • Neutrophil Rel % 08/13/2021 41.3* 42.7 - 76.0 % Final   • Lymphocyte Rel % 08/13/2021 43.1  19.6 - 45.3 % Final   • Monocyte Rel % 08/13/2021 11.9  5.0 - 12.0 % Final   • Eosinophil " Rel % 08/13/2021 2.6  0.3 - 6.2 % Final   • Basophil Rel % 08/13/2021 0.7  0.0 - 1.5 % Final   • Neutrophils Absolute 08/13/2021 3.43  1.70 - 7.00 10*3/mm3 Final   • Lymphocytes Absolute 08/13/2021 3.58* 0.70 - 3.10 10*3/mm3 Final   • Monocytes Absolute 08/13/2021 0.99* 0.10 - 0.90 10*3/mm3 Final   • Eosinophils Absolute 08/13/2021 0.22  0.00 - 0.40 10*3/mm3 Final   • Basophils Absolute 08/13/2021 0.06  0.00 - 0.20 10*3/mm3 Final   • Immature Granulocyte Rel % 08/13/2021 0.4  0.0 - 0.5 % Final   • Immature Grans Absolute 08/13/2021 0.03  0.00 - 0.05 10*3/mm3 Final   • nRBC 08/13/2021 0.0  0.0 - 0.2 /100 WBC Final   • Glucose 08/13/2021 86  65 - 99 mg/dL Final   • BUN 08/13/2021 30* 8 - 23 mg/dL Final   • Creatinine 08/13/2021 0.89  0.57 - 1.00 mg/dL Final   • eGFR Non  Am 08/13/2021 61  >60 mL/min/1.73 Final    Comment: The MDRD GFR formula is only valid for adults with stable  renal function between ages 18 and 70.     • eGFR  Am 08/13/2021 74  >60 mL/min/1.73 Final   • BUN/Creatinine Ratio 08/13/2021 33.7* 7.0 - 25.0 Final   • Sodium 08/13/2021 142  136 - 145 mmol/L Final   • Potassium 08/13/2021 4.8  3.5 - 5.2 mmol/L Final   • Chloride 08/13/2021 105  98 - 107 mmol/L Final   • Total CO2 08/13/2021 28.9  22.0 - 29.0 mmol/L Final   • Calcium 08/13/2021 10.2  8.6 - 10.5 mg/dL Final   • Total Protein 08/13/2021 6.9  6.0 - 8.5 g/dL Final   • Albumin 08/13/2021 5.00  3.50 - 5.20 g/dL Final   • Globulin 08/13/2021 1.9  gm/dL Final   • A/G Ratio 08/13/2021 2.6  g/dL Final   • Total Bilirubin 08/13/2021 0.4  0.0 - 1.2 mg/dL Final   • Alkaline Phosphatase 08/13/2021 66  39 - 117 U/L Final   • AST (SGOT) 08/13/2021 25  1 - 32 U/L Final   • ALT (SGPT) 08/13/2021 17  1 - 33 U/L Final   • Free T4 08/13/2021 1.12  0.93 - 1.70 ng/dL Final    Results may be falsely increased if patient taking Biotin.   • TSH 08/13/2021 0.528  0.270 - 4.200 uIU/mL Final   • T3, Free 08/13/2021 3.3  2.0 - 4.4 pg/mL Final     Data  "reviewed: Dr. Pulido's documentation       Results Review:    I reviewed the patient's new clinical results.     Assessment and Plan    Problem List Items Addressed This Visit        Other    Hyperthyroidism - Primary    Relevant Orders    TSH    T4, Free    T3, Free    Comprehensive Metabolic Panel    Lipid Panel    Multinodular goiter    Relevant Orders    TSH    T4, Free    T3, Free    Comprehensive Metabolic Panel    Lipid Panel        Hyperthyroidism  Continue PTU 50 mg oral daily  Emphasized to the patient that she needs to alert us immediately if her thyroid levels are ever abnormal for us to tweak the dosage of her PTU to protect her exacerbation of A. fib.    Hyperlipidemia  Continue Lipitor 40 mg oral daily.    All the above problems are new for me  Interpreted the blood work-up/imaging results performed by the primary care/consulting physician -    Refills sent to pharmacy    Follow Up     Patient was given instructions and counseling regarding her condition or for health maintenance advice. Please see specific information pulled into the AVS if appropriate.       Thank you for asking me to see your patient, Edelmira Gerber in consultation.         Nikia Solano MD  08/20/21      EMR Dragon / transcription disclaimer:     \"Dictated utilizing Dragon dictation\".                      "

## 2022-05-03 ENCOUNTER — OFFICE VISIT (OUTPATIENT)
Dept: ENDOCRINOLOGY | Age: 82
End: 2022-05-03

## 2022-05-03 VITALS
OXYGEN SATURATION: 95 % | DIASTOLIC BLOOD PRESSURE: 72 MMHG | WEIGHT: 193.4 LBS | HEART RATE: 50 BPM | HEIGHT: 62 IN | SYSTOLIC BLOOD PRESSURE: 118 MMHG | BODY MASS INDEX: 35.59 KG/M2

## 2022-05-03 DIAGNOSIS — E04.2 MULTINODULAR GOITER: ICD-10-CM

## 2022-05-03 DIAGNOSIS — E05.90 HYPERTHYROIDISM: Primary | ICD-10-CM

## 2022-05-03 DIAGNOSIS — R53.82 CHRONIC FATIGUE: ICD-10-CM

## 2022-05-03 PROCEDURE — 99214 OFFICE O/P EST MOD 30 MIN: CPT | Performed by: INTERNAL MEDICINE

## 2022-05-03 RX ORDER — PROPYLTHIOURACIL 50 MG/1
TABLET ORAL
Qty: 90 TABLET | Refills: 1 | Status: SHIPPED | OUTPATIENT
Start: 2022-05-03 | End: 2022-09-16 | Stop reason: SDUPTHER

## 2022-05-03 NOTE — PROGRESS NOTES
"Chief Complaint  Chief Complaint   Patient presents with   • Hyperthyroidism       Subjective          History of Present Illness    Edelmira Gerber 81 y.o. presents as a F/u patient for the evaluation of Hyperthyroidism.    Today in clinic patient reports she is on PTU 50 mg 1 tablet a day.  Could not tolerate methimazole in the past.  She does complain of hair loss, brittle nails secondary to the PTU.  Was interested in considering ablation.  Does have history of atrial fibrillation, shortness of breath.     Today in clinic she reports that her energy levels are poor, weight has been stable.  No complaints of difficulty in swallowing or change in voice.      Reviewed primary care physician's/consulting physician documentation and lab results         I have reviewed the patient's allergies, medicines, past medical hx, family hx and social hx in detail.    Objective   Vital Signs:   /72   Pulse 50   Ht 157.5 cm (62\")   Wt 87.7 kg (193 lb 6.4 oz)   SpO2 95%   BMI 35.37 kg/m²   Physical Exam   General appearance - no distress  Eyes- anicteric sclera  Ear nose and throat-external ears and nose normal.    Respiratory-normal chest on inspection.  No respiratory distress noted.  Skin-no rashes.  Neuro-alert and oriented x3            Result Review :   The following data was reviewed by: Nikia Solano MD on 05/03/2022:  Orders Only on 08/13/2021   Component Date Value Ref Range Status   • WBC 08/13/2021 8.31  3.40 - 10.80 10*3/mm3 Final   • RBC 08/13/2021 5.08  3.77 - 5.28 10*6/mm3 Final   • Hemoglobin 08/13/2021 14.7  12.0 - 15.9 g/dL Final   • Hematocrit 08/13/2021 45.7  34.0 - 46.6 % Final   • MCV 08/13/2021 90.0  79.0 - 97.0 fL Final   • MCH 08/13/2021 28.9  26.6 - 33.0 pg Final   • MCHC 08/13/2021 32.2  31.5 - 35.7 g/dL Final   • RDW 08/13/2021 13.4  12.3 - 15.4 % Final   • Platelets 08/13/2021 184  140 - 450 10*3/mm3 Final   • Neutrophil Rel % 08/13/2021 41.3 (A) 42.7 - 76.0 % Final   • Lymphocyte Rel % " 08/13/2021 43.1  19.6 - 45.3 % Final   • Monocyte Rel % 08/13/2021 11.9  5.0 - 12.0 % Final   • Eosinophil Rel % 08/13/2021 2.6  0.3 - 6.2 % Final   • Basophil Rel % 08/13/2021 0.7  0.0 - 1.5 % Final   • Neutrophils Absolute 08/13/2021 3.43  1.70 - 7.00 10*3/mm3 Final   • Lymphocytes Absolute 08/13/2021 3.58 (A) 0.70 - 3.10 10*3/mm3 Final   • Monocytes Absolute 08/13/2021 0.99 (A) 0.10 - 0.90 10*3/mm3 Final   • Eosinophils Absolute 08/13/2021 0.22  0.00 - 0.40 10*3/mm3 Final   • Basophils Absolute 08/13/2021 0.06  0.00 - 0.20 10*3/mm3 Final   • Immature Granulocyte Rel % 08/13/2021 0.4  0.0 - 0.5 % Final   • Immature Grans Absolute 08/13/2021 0.03  0.00 - 0.05 10*3/mm3 Final   • nRBC 08/13/2021 0.0  0.0 - 0.2 /100 WBC Final   • Glucose 08/13/2021 86  65 - 99 mg/dL Final   • BUN 08/13/2021 30 (A) 8 - 23 mg/dL Final   • Creatinine 08/13/2021 0.89  0.57 - 1.00 mg/dL Final   • eGFR Non  Am 08/13/2021 61  >60 mL/min/1.73 Final    Comment: The MDRD GFR formula is only valid for adults with stable  renal function between ages 18 and 70.     • eGFR  Am 08/13/2021 74  >60 mL/min/1.73 Final   • BUN/Creatinine Ratio 08/13/2021 33.7 (A) 7.0 - 25.0 Final   • Sodium 08/13/2021 142  136 - 145 mmol/L Final   • Potassium 08/13/2021 4.8  3.5 - 5.2 mmol/L Final   • Chloride 08/13/2021 105  98 - 107 mmol/L Final   • Total CO2 08/13/2021 28.9  22.0 - 29.0 mmol/L Final   • Calcium 08/13/2021 10.2  8.6 - 10.5 mg/dL Final   • Total Protein 08/13/2021 6.9  6.0 - 8.5 g/dL Final   • Albumin 08/13/2021 5.00  3.50 - 5.20 g/dL Final   • Globulin 08/13/2021 1.9  gm/dL Final   • A/G Ratio 08/13/2021 2.6  g/dL Final   • Total Bilirubin 08/13/2021 0.4  0.0 - 1.2 mg/dL Final   • Alkaline Phosphatase 08/13/2021 66  39 - 117 U/L Final   • AST (SGOT) 08/13/2021 25  1 - 32 U/L Final   • ALT (SGPT) 08/13/2021 17  1 - 33 U/L Final   • Free T4 08/13/2021 1.12  0.93 - 1.70 ng/dL Final    Results may be falsely increased if patient taking  "Biotin.   • TSH 08/13/2021 0.528  0.270 - 4.200 uIU/mL Final   • T3, Free 08/13/2021 3.3  2.0 - 4.4 pg/mL Final     Data reviewed: PCP notes       Results Review:    I reviewed the patient's new clinical results.     Assessment and Plan    Problem List Items Addressed This Visit        Other    Hyperthyroidism - Primary    Relevant Medications    propylthiouracil (PTU) 50 MG tablet    Other Relevant Orders    T3, Free    T4, Free    TSH    NM Thyroid Uptake & Scan    Multinodular goiter    Relevant Medications    propylthiouracil (PTU) 50 MG tablet    Other Relevant Orders    T3, Free    T4, Free    TSH    Chronic fatigue    Relevant Orders    T3, Free    T4, Free    TSH        Hypothyroidism-chronic problem  Decrease PTU to half a tablet-25 mg daily  Repeat blood work-up in 4 to 6 weeks from now  Patient understands the risk of hyperthyroidism especially with her history of A. fib.    Proceed with the uptake scan to see if she would be a candidate for ablation    Hyperlipidemia  Continue Lipitor 40 mg oral daily.    Interpreted the blood work-up/imaging results performed by the primary care/consulting physician -    Refills sent to pharmacy    Follow Up     Patient was given instructions and counseling regarding her condition or for health maintenance advice. Please see specific information pulled into the AVS if appropriate.       Thank you for asking me to see your patient, Edelmira Gerber in consultation.         Nikia Solano MD  05/03/22      EMR Dragon / transcription disclaimer:     \"Dictated utilizing Dragon dictation\".                      "

## 2022-06-17 ENCOUNTER — TELEPHONE (OUTPATIENT)
Dept: ENDOCRINOLOGY | Age: 82
End: 2022-06-17

## 2022-06-17 NOTE — TELEPHONE ENCOUNTER
PATIENTS DAUGHTER CALLED, PATIENT JUST GOT OUT OF THE HOSPITAL  (NOT ON THE RELEASE CONTACT FORM) SAID HER THYROID IS OUT OF WACK AND SHE HAS ONLY BEEN TAKING A HALF OF HER THYROID PILL.    IF YOU CAN CALL HER DAUGHTER BACK -[phone]

## 2022-06-20 NOTE — TELEPHONE ENCOUNTER
WRONG NUMBER LISTED ON THE CALL BACK AND UNABLE TO RELEASE INFORMATION TO SOMEONE  NOT ON THE LIST.

## 2022-06-21 ENCOUNTER — TELEPHONE (OUTPATIENT)
Dept: ENDOCRINOLOGY | Age: 82
End: 2022-06-21

## 2022-06-30 DIAGNOSIS — E05.90 HYPERTHYROIDISM: Primary | ICD-10-CM

## 2022-09-16 ENCOUNTER — TELEPHONE (OUTPATIENT)
Dept: ENDOCRINOLOGY | Age: 82
End: 2022-09-16

## 2022-09-16 DIAGNOSIS — E05.90 HYPERTHYROIDISM: ICD-10-CM

## 2022-09-16 RX ORDER — PROPYLTHIOURACIL 50 MG/1
TABLET ORAL
Qty: 90 TABLET | Refills: 1 | Status: SHIPPED | OUTPATIENT
Start: 2022-09-16

## 2023-02-27 ENCOUNTER — OFFICE VISIT (OUTPATIENT)
Dept: ENDOCRINOLOGY | Age: 83
End: 2023-02-27
Payer: MEDICARE

## 2023-02-27 VITALS
HEART RATE: 110 BPM | TEMPERATURE: 97 F | SYSTOLIC BLOOD PRESSURE: 122 MMHG | OXYGEN SATURATION: 98 % | HEIGHT: 62 IN | BODY MASS INDEX: 32.02 KG/M2 | DIASTOLIC BLOOD PRESSURE: 80 MMHG | WEIGHT: 174 LBS

## 2023-02-27 DIAGNOSIS — E05.90 HYPERTHYROIDISM: Primary | ICD-10-CM

## 2023-02-27 PROCEDURE — 99213 OFFICE O/P EST LOW 20 MIN: CPT | Performed by: NURSE PRACTITIONER

## 2023-02-27 RX ORDER — UREA 10 %
LOTION (ML) TOPICAL
COMMUNITY

## 2023-02-27 RX ORDER — MULTIVIT WITH MINERALS/LUTEIN
250 TABLET ORAL DAILY
COMMUNITY

## 2023-02-27 RX ORDER — POTASSIUM CHLORIDE 750 MG/1
TABLET, FILM COATED, EXTENDED RELEASE ORAL
COMMUNITY
Start: 2023-01-14

## 2023-02-27 RX ORDER — ERGOCALCIFEROL 1.25 MG/1
1.25 CAPSULE ORAL
COMMUNITY
Start: 2023-01-13

## 2023-02-27 NOTE — PROGRESS NOTES
"Chief Complaint  Hyperthyroidism (Pt states that her energy levels have been very low she has no family hx of thyroid disease and her weight is lower than expected )    Subjective        Edelmira eGrber presents to [facility] ENDOCRINOLOGY  History of Present Illness    Hyperthyroid   Currently on PTU 50mg daily  Was on 1/2 tablet at some point during the summer, but related to c/o HA, pcp increased back to one tablet daily  On2 via NC at 3L, with SOA at rest, on ABX for PNA currently, Left Lower Lung with crackles  + AFIB and + CHF  Not currently with CP  Possible UTI   Having nose bleeds and low h/h  Seeing PCP for f/u tomorrow  Low grade fever up until today   In a wheelchair  Daughter present with patient   Lost 30 lbs since thanksgiving     Patient is using a portable oxygen tank that makes her feel more short of air than her baseline     Objective   Vital Signs:  /80   Pulse 110   Temp 97 °F (36.1 °C) (Temporal)   Ht 157.5 cm (62\")   Wt 78.9 kg (174 lb)   SpO2 98%   BMI 31.83 kg/m²   Estimated body mass index is 31.83 kg/m² as calculated from the following:    Height as of this encounter: 157.5 cm (62\").    Weight as of this encounter: 78.9 kg (174 lb).             Physical Exam  Vitals reviewed.   Constitutional:       General: She is not in acute distress.  HENT:      Head: Normocephalic and atraumatic.   Cardiovascular:      Rate and Rhythm: Tachycardia present. Rhythm irregular.   Pulmonary:      Breath sounds: Rales (left lower ) present.      Comments: labored  Musculoskeletal:         General: No signs of injury. Normal range of motion.   Skin:     General: Skin is warm and dry.   Neurological:      Mental Status: She is alert and oriented to person, place, and time. Mental status is at baseline.      Motor: Weakness present.      Gait: Gait abnormal.   Psychiatric:         Mood and Affect: Mood normal.         Behavior: Behavior normal.         Thought Content: Thought " content normal.         Judgment: Judgment normal.        Result Review :  The following data was reviewed by: YESSENIA Lopez on 02/27/2023:  Common labs    Common Labs 1/9/23 1/10/23 1/11/23   Hemoglobin 12.6 12.6 12.2   Hematocrit 40.0 39.7 38.9                        Assessment and Plan   Diagnoses and all orders for this visit:    1. Hyperthyroidism (Primary)  -     TSH  -     T4, Free  -     T3, Free  -     Comprehensive Metabolic Panel  -     CBC & Differential             Follow Up   Return in about 3 months (around 5/27/2023).     Labs today  To seek ER if SOA persists or worsens, keep PCP f/u tomorrow     Patient was given instructions and counseling regarding her condition or for health maintenance advice. Please see specific information pulled into the AVS if appropriate.     YESSENIA Lopez

## 2023-02-28 LAB
ALBUMIN SERPL-MCNC: 3.5 G/DL (ref 3.5–5.2)
ALBUMIN/GLOB SERPL: 1.2 G/DL
ALP SERPL-CCNC: 75 U/L (ref 39–117)
ALT SERPL-CCNC: 8 U/L (ref 1–33)
AST SERPL-CCNC: 16 U/L (ref 1–32)
BASOPHILS # BLD AUTO: 0.05 10*3/MM3 (ref 0–0.2)
BASOPHILS NFR BLD AUTO: 0.6 % (ref 0–1.5)
BILIRUB SERPL-MCNC: 0.4 MG/DL (ref 0–1.2)
BUN SERPL-MCNC: 14 MG/DL (ref 8–23)
BUN/CREAT SERPL: 21.5 (ref 7–25)
CALCIUM SERPL-MCNC: 9 MG/DL (ref 8.6–10.5)
CHLORIDE SERPL-SCNC: 99 MMOL/L (ref 98–107)
CO2 SERPL-SCNC: 28.9 MMOL/L (ref 22–29)
CREAT SERPL-MCNC: 0.65 MG/DL (ref 0.57–1)
EGFRCR SERPLBLD CKD-EPI 2021: 88.6 ML/MIN/1.73
EOSINOPHIL # BLD AUTO: 0.42 10*3/MM3 (ref 0–0.4)
EOSINOPHIL NFR BLD AUTO: 5.3 % (ref 0.3–6.2)
ERYTHROCYTE [DISTWIDTH] IN BLOOD BY AUTOMATED COUNT: 13.6 % (ref 12.3–15.4)
GLOBULIN SER CALC-MCNC: 3 GM/DL
GLUCOSE SERPL-MCNC: 116 MG/DL (ref 65–99)
HCT VFR BLD AUTO: 35.2 % (ref 34–46.6)
HGB BLD-MCNC: 11.3 G/DL (ref 12–15.9)
IMM GRANULOCYTES # BLD AUTO: 0.03 10*3/MM3 (ref 0–0.05)
IMM GRANULOCYTES NFR BLD AUTO: 0.4 % (ref 0–0.5)
LYMPHOCYTES # BLD AUTO: 2 10*3/MM3 (ref 0.7–3.1)
LYMPHOCYTES NFR BLD AUTO: 25.3 % (ref 19.6–45.3)
MCH RBC QN AUTO: 28.9 PG (ref 26.6–33)
MCHC RBC AUTO-ENTMCNC: 32.1 G/DL (ref 31.5–35.7)
MCV RBC AUTO: 90 FL (ref 79–97)
MONOCYTES # BLD AUTO: 0.98 10*3/MM3 (ref 0.1–0.9)
MONOCYTES NFR BLD AUTO: 12.4 % (ref 5–12)
NEUTROPHILS # BLD AUTO: 4.42 10*3/MM3 (ref 1.7–7)
NEUTROPHILS NFR BLD AUTO: 56 % (ref 42.7–76)
NRBC BLD AUTO-RTO: 0 /100 WBC (ref 0–0.2)
PLATELET # BLD AUTO: 343 10*3/MM3 (ref 140–450)
POTASSIUM SERPL-SCNC: 4.3 MMOL/L (ref 3.5–5.2)
PROT SERPL-MCNC: 6.5 G/DL (ref 6–8.5)
RBC # BLD AUTO: 3.91 10*6/MM3 (ref 3.77–5.28)
SODIUM SERPL-SCNC: 140 MMOL/L (ref 136–145)
T3FREE SERPL-MCNC: 3.5 PG/ML (ref 2–4.4)
T4 FREE SERPL-MCNC: 1.15 NG/DL (ref 0.93–1.7)
TSH SERPL DL<=0.005 MIU/L-ACNC: 0.39 UIU/ML (ref 0.27–4.2)
WBC # BLD AUTO: 7.9 10*3/MM3 (ref 3.4–10.8)

## 2023-05-30 ENCOUNTER — OFFICE VISIT (OUTPATIENT)
Dept: ENDOCRINOLOGY | Age: 83
End: 2023-05-30

## 2023-05-30 VITALS
HEART RATE: 87 BPM | TEMPERATURE: 97.1 F | DIASTOLIC BLOOD PRESSURE: 80 MMHG | OXYGEN SATURATION: 95 % | BODY MASS INDEX: 31.65 KG/M2 | HEIGHT: 62 IN | SYSTOLIC BLOOD PRESSURE: 132 MMHG | WEIGHT: 172 LBS

## 2023-05-30 DIAGNOSIS — E05.90 HYPERTHYROIDISM: Primary | ICD-10-CM

## 2023-05-30 DIAGNOSIS — I48.91 ATRIAL FIBRILLATION, UNSPECIFIED TYPE: ICD-10-CM

## 2023-05-30 RX ORDER — METHOCARBAMOL 750 MG/1
TABLET, FILM COATED ORAL
COMMUNITY
Start: 2023-04-11

## 2023-05-31 ENCOUNTER — TELEPHONE (OUTPATIENT)
Dept: ENDOCRINOLOGY | Age: 83
End: 2023-05-31

## 2023-05-31 LAB
T4 FREE SERPL-MCNC: 1.15 NG/DL (ref 0.82–1.77)
TSH SERPL DL<=0.005 MIU/L-ACNC: 0.13 UIU/ML (ref 0.45–4.5)

## 2023-05-31 NOTE — TELEPHONE ENCOUNTER
5/31 called and lm for pt to call reg her labs   Ok for hub to read to patient   Please schedule labs if needed or follow ups  Ok for  to read to patient   Please schedule labs if needed or follow ups      ----- Message from Johan Avila, DO sent at 5/31/2023  9:28 AM EDT -----  Could you please let Ms. Gerber know that her thyroid labs were in an acceptable range and I would like for her to continue her current dose of PTU